# Patient Record
Sex: FEMALE | Race: WHITE | NOT HISPANIC OR LATINO | Employment: FULL TIME | URBAN - METROPOLITAN AREA
[De-identification: names, ages, dates, MRNs, and addresses within clinical notes are randomized per-mention and may not be internally consistent; named-entity substitution may affect disease eponyms.]

---

## 2017-11-09 ENCOUNTER — HISTORICAL (OUTPATIENT)
Dept: RADIOLOGY | Facility: HOSPITAL | Age: 62
End: 2017-11-09

## 2018-10-01 ENCOUNTER — HISTORICAL (OUTPATIENT)
Dept: RADIOLOGY | Facility: HOSPITAL | Age: 63
End: 2018-10-01

## 2018-11-12 ENCOUNTER — HISTORICAL (OUTPATIENT)
Dept: INFUSION THERAPY | Facility: HOSPITAL | Age: 63
End: 2018-11-12

## 2018-11-12 ENCOUNTER — HISTORICAL (OUTPATIENT)
Dept: RADIOLOGY | Facility: HOSPITAL | Age: 63
End: 2018-11-12

## 2019-11-14 ENCOUNTER — HISTORICAL (OUTPATIENT)
Dept: RADIOLOGY | Facility: HOSPITAL | Age: 64
End: 2019-11-14

## 2020-11-16 ENCOUNTER — HISTORICAL (OUTPATIENT)
Dept: RADIOLOGY | Facility: HOSPITAL | Age: 65
End: 2020-11-16

## 2021-12-02 ENCOUNTER — HISTORICAL (OUTPATIENT)
Dept: RADIOLOGY | Facility: HOSPITAL | Age: 66
End: 2021-12-02

## 2021-12-07 ENCOUNTER — HISTORICAL (OUTPATIENT)
Dept: RADIOLOGY | Facility: HOSPITAL | Age: 66
End: 2021-12-07

## 2021-12-09 ENCOUNTER — HISTORICAL (OUTPATIENT)
Dept: RADIOLOGY | Facility: HOSPITAL | Age: 66
End: 2021-12-09

## 2022-01-05 ENCOUNTER — HISTORICAL (OUTPATIENT)
Dept: RADIOLOGY | Facility: HOSPITAL | Age: 67
End: 2022-01-05

## 2022-01-06 ENCOUNTER — HISTORICAL (OUTPATIENT)
Dept: PREADMISSION TESTING | Facility: HOSPITAL | Age: 67
End: 2022-01-06

## 2022-01-06 LAB
ABS NEUT (OLG): 3.85 X10(3)/MCL (ref 2.1–9.2)
BASOPHILS # BLD AUTO: 0 X10(3)/MCL (ref 0–0.2)
BASOPHILS NFR BLD AUTO: 1 %
BUN SERPL-MCNC: 17.3 MG/DL (ref 9.8–20.1)
CALCIUM SERPL-MCNC: 9.6 MG/DL (ref 8.7–10.5)
CHLORIDE SERPL-SCNC: 104 MMOL/L (ref 98–107)
CO2 SERPL-SCNC: 26 MMOL/L (ref 23–31)
CREAT SERPL-MCNC: 0.77 MG/DL (ref 0.55–1.02)
CREAT/UREA NIT SERPL: 22
EOSINOPHIL # BLD AUTO: 0.2 X10(3)/MCL (ref 0–0.9)
EOSINOPHIL NFR BLD AUTO: 3 %
ERYTHROCYTE [DISTWIDTH] IN BLOOD BY AUTOMATED COUNT: 14.3 % (ref 11.5–17)
GLUCOSE SERPL-MCNC: 127 MG/DL (ref 82–115)
HCT VFR BLD AUTO: 46.3 % (ref 37–47)
HGB BLD-MCNC: 14.4 GM/DL (ref 12–16)
LYMPHOCYTES # BLD AUTO: 1.6 X10(3)/MCL (ref 0.6–4.6)
LYMPHOCYTES NFR BLD AUTO: 25 %
MCH RBC QN AUTO: 27.3 PG (ref 27–31)
MCHC RBC AUTO-ENTMCNC: 31.1 GM/DL (ref 33–36)
MCV RBC AUTO: 87.9 FL (ref 80–94)
MONOCYTES # BLD AUTO: 0.5 X10(3)/MCL (ref 0.1–1.3)
MONOCYTES NFR BLD AUTO: 8 %
NEUTROPHILS # BLD AUTO: 3.85 X10(3)/MCL (ref 2.1–9.2)
NEUTROPHILS NFR BLD AUTO: 62 %
PLATELET # BLD AUTO: 227 X10(3)/MCL (ref 130–400)
PMV BLD AUTO: 10.1 FL (ref 9.4–12.4)
POTASSIUM SERPL-SCNC: 4.8 MMOL/L (ref 3.5–5.1)
RBC # BLD AUTO: 5.27 X10(6)/MCL (ref 4.2–5.4)
SARS-COV-2 AG RESP QL IA.RAPID: NEGATIVE
SODIUM SERPL-SCNC: 140 MMOL/L (ref 136–145)
WBC # SPEC AUTO: 6.2 X10(3)/MCL (ref 4.5–11.5)

## 2022-01-07 ENCOUNTER — HISTORICAL (OUTPATIENT)
Dept: SURGERY | Facility: HOSPITAL | Age: 67
End: 2022-01-07

## 2022-01-24 ENCOUNTER — HISTORICAL (OUTPATIENT)
Dept: RADIATION THERAPY | Facility: HOSPITAL | Age: 67
End: 2022-01-24

## 2022-03-07 ENCOUNTER — HISTORICAL (OUTPATIENT)
Dept: RADIATION THERAPY | Facility: HOSPITAL | Age: 67
End: 2022-03-07

## 2022-03-10 ENCOUNTER — HISTORICAL (OUTPATIENT)
Dept: RADIATION THERAPY | Facility: HOSPITAL | Age: 67
End: 2022-03-10

## 2022-03-14 ENCOUNTER — HISTORICAL (OUTPATIENT)
Dept: RADIATION THERAPY | Facility: HOSPITAL | Age: 67
End: 2022-03-14

## 2022-03-15 ENCOUNTER — HISTORICAL (OUTPATIENT)
Dept: RADIATION THERAPY | Facility: HOSPITAL | Age: 67
End: 2022-03-15

## 2022-03-16 ENCOUNTER — HISTORICAL (OUTPATIENT)
Dept: RADIATION THERAPY | Facility: HOSPITAL | Age: 67
End: 2022-03-16

## 2022-03-17 ENCOUNTER — HISTORICAL (OUTPATIENT)
Dept: RADIATION THERAPY | Facility: HOSPITAL | Age: 67
End: 2022-03-17

## 2022-03-18 ENCOUNTER — HISTORICAL (OUTPATIENT)
Dept: RADIATION THERAPY | Facility: HOSPITAL | Age: 67
End: 2022-03-18

## 2022-03-21 ENCOUNTER — HISTORICAL (OUTPATIENT)
Dept: RADIATION THERAPY | Facility: HOSPITAL | Age: 67
End: 2022-03-21

## 2022-03-22 ENCOUNTER — HISTORICAL (OUTPATIENT)
Dept: RADIATION THERAPY | Facility: HOSPITAL | Age: 67
End: 2022-03-22

## 2022-03-23 ENCOUNTER — HISTORICAL (OUTPATIENT)
Dept: RADIATION THERAPY | Facility: HOSPITAL | Age: 67
End: 2022-03-23

## 2022-03-24 ENCOUNTER — HISTORICAL (OUTPATIENT)
Dept: RADIATION THERAPY | Facility: HOSPITAL | Age: 67
End: 2022-03-24

## 2022-03-25 ENCOUNTER — HISTORICAL (OUTPATIENT)
Dept: RADIATION THERAPY | Facility: HOSPITAL | Age: 67
End: 2022-03-25

## 2022-03-28 ENCOUNTER — HISTORICAL (OUTPATIENT)
Dept: RADIATION THERAPY | Facility: HOSPITAL | Age: 67
End: 2022-03-28

## 2022-03-29 ENCOUNTER — HISTORICAL (OUTPATIENT)
Dept: RADIATION THERAPY | Facility: HOSPITAL | Age: 67
End: 2022-03-29

## 2022-03-30 ENCOUNTER — HISTORICAL (OUTPATIENT)
Dept: RADIATION THERAPY | Facility: HOSPITAL | Age: 67
End: 2022-03-30

## 2022-03-31 ENCOUNTER — HISTORICAL (OUTPATIENT)
Dept: RADIATION THERAPY | Facility: HOSPITAL | Age: 67
End: 2022-03-31

## 2022-04-01 ENCOUNTER — HISTORICAL (OUTPATIENT)
Dept: RADIATION THERAPY | Facility: HOSPITAL | Age: 67
End: 2022-04-01

## 2022-04-04 ENCOUNTER — HISTORICAL (OUTPATIENT)
Dept: RADIATION THERAPY | Facility: HOSPITAL | Age: 67
End: 2022-04-04

## 2022-04-05 ENCOUNTER — HISTORICAL (OUTPATIENT)
Dept: RADIATION THERAPY | Facility: HOSPITAL | Age: 67
End: 2022-04-05

## 2022-04-06 ENCOUNTER — HISTORICAL (OUTPATIENT)
Dept: RADIATION THERAPY | Facility: HOSPITAL | Age: 67
End: 2022-04-06

## 2022-04-07 ENCOUNTER — HISTORICAL (OUTPATIENT)
Dept: RADIATION THERAPY | Facility: HOSPITAL | Age: 67
End: 2022-04-07

## 2022-04-08 ENCOUNTER — HISTORICAL (OUTPATIENT)
Dept: RADIATION THERAPY | Facility: HOSPITAL | Age: 67
End: 2022-04-08

## 2022-04-11 ENCOUNTER — HISTORICAL (OUTPATIENT)
Dept: RADIATION THERAPY | Facility: HOSPITAL | Age: 67
End: 2022-04-11

## 2022-04-11 ENCOUNTER — HISTORICAL (OUTPATIENT)
Dept: ADMINISTRATIVE | Facility: HOSPITAL | Age: 67
End: 2022-04-11
Payer: COMMERCIAL

## 2022-04-25 VITALS
DIASTOLIC BLOOD PRESSURE: 92 MMHG | HEIGHT: 67 IN | SYSTOLIC BLOOD PRESSURE: 130 MMHG | WEIGHT: 260.56 LBS | BODY MASS INDEX: 40.9 KG/M2

## 2022-05-19 ENCOUNTER — OFFICE VISIT (OUTPATIENT)
Dept: URGENT CARE | Facility: CLINIC | Age: 67
End: 2022-05-19
Payer: COMMERCIAL

## 2022-05-19 VITALS
BODY MASS INDEX: 38.04 KG/M2 | HEIGHT: 68 IN | OXYGEN SATURATION: 98 % | DIASTOLIC BLOOD PRESSURE: 93 MMHG | SYSTOLIC BLOOD PRESSURE: 155 MMHG | TEMPERATURE: 98 F | RESPIRATION RATE: 17 BRPM | HEART RATE: 75 BPM | WEIGHT: 251 LBS

## 2022-05-19 DIAGNOSIS — J06.9 ACUTE URI: Primary | ICD-10-CM

## 2022-05-19 PROCEDURE — 3008F BODY MASS INDEX DOCD: CPT | Mod: CPTII,,, | Performed by: FAMILY MEDICINE

## 2022-05-19 PROCEDURE — 1160F RVW MEDS BY RX/DR IN RCRD: CPT | Mod: CPTII,,, | Performed by: FAMILY MEDICINE

## 2022-05-19 PROCEDURE — 3077F SYST BP >= 140 MM HG: CPT | Mod: CPTII,,, | Performed by: FAMILY MEDICINE

## 2022-05-19 PROCEDURE — 3080F DIAST BP >= 90 MM HG: CPT | Mod: CPTII,,, | Performed by: FAMILY MEDICINE

## 2022-05-19 PROCEDURE — 99203 OFFICE O/P NEW LOW 30 MIN: CPT | Mod: S$GLB,,, | Performed by: FAMILY MEDICINE

## 2022-05-19 PROCEDURE — 99203 PR OFFICE/OUTPT VISIT, NEW, LEVL III, 30-44 MIN: ICD-10-PCS | Mod: S$GLB,,, | Performed by: FAMILY MEDICINE

## 2022-05-19 PROCEDURE — 4010F ACE/ARB THERAPY RXD/TAKEN: CPT | Mod: CPTII,,, | Performed by: FAMILY MEDICINE

## 2022-05-19 PROCEDURE — 3080F PR MOST RECENT DIASTOLIC BLOOD PRESSURE >= 90 MM HG: ICD-10-PCS | Mod: CPTII,,, | Performed by: FAMILY MEDICINE

## 2022-05-19 PROCEDURE — 1159F PR MEDICATION LIST DOCUMENTED IN MEDICAL RECORD: ICD-10-PCS | Mod: CPTII,,, | Performed by: FAMILY MEDICINE

## 2022-05-19 PROCEDURE — 1160F PR REVIEW ALL MEDS BY PRESCRIBER/CLIN PHARMACIST DOCUMENTED: ICD-10-PCS | Mod: CPTII,,, | Performed by: FAMILY MEDICINE

## 2022-05-19 PROCEDURE — 1159F MED LIST DOCD IN RCRD: CPT | Mod: CPTII,,, | Performed by: FAMILY MEDICINE

## 2022-05-19 PROCEDURE — 3008F PR BODY MASS INDEX (BMI) DOCUMENTED: ICD-10-PCS | Mod: CPTII,,, | Performed by: FAMILY MEDICINE

## 2022-05-19 PROCEDURE — 4010F PR ACE/ARB THEARPY RXD/TAKEN: ICD-10-PCS | Mod: CPTII,,, | Performed by: FAMILY MEDICINE

## 2022-05-19 PROCEDURE — 3077F PR MOST RECENT SYSTOLIC BLOOD PRESSURE >= 140 MM HG: ICD-10-PCS | Mod: CPTII,,, | Performed by: FAMILY MEDICINE

## 2022-05-19 RX ORDER — LACOSAMIDE 100 MG/1
TABLET ORAL EVERY 12 HOURS
COMMUNITY
End: 2022-06-02 | Stop reason: SDUPTHER

## 2022-05-19 RX ORDER — ALBUTEROL SULFATE 90 UG/1
2 AEROSOL, METERED RESPIRATORY (INHALATION) EVERY 6 HOURS PRN
Qty: 18 G | Refills: 0 | Status: SHIPPED | OUTPATIENT
Start: 2022-05-19 | End: 2022-08-29

## 2022-05-19 RX ORDER — ATORVASTATIN CALCIUM 10 MG/1
10 TABLET, FILM COATED ORAL DAILY
COMMUNITY
End: 2023-07-05

## 2022-05-19 RX ORDER — LOSARTAN POTASSIUM 100 MG/1
100 TABLET ORAL DAILY
COMMUNITY
End: 2023-01-05 | Stop reason: DRUGHIGH

## 2022-05-19 RX ORDER — LETROZOLE 2.5 MG/1
2.5 TABLET, FILM COATED ORAL DAILY
COMMUNITY
Start: 2022-04-18 | End: 2022-07-05

## 2022-05-19 RX ORDER — PREDNISONE 20 MG/1
40 TABLET ORAL DAILY
Qty: 6 TABLET | Refills: 0 | Status: SHIPPED | OUTPATIENT
Start: 2022-05-19 | End: 2022-05-22

## 2022-05-19 NOTE — PROGRESS NOTES
"Subjective:       Patient ID: Yoli Burnett is a 66 y.o. female.    Vitals:  height is 5' 8" (1.727 m) and weight is 113.9 kg (251 lb). Her temperature is 98.3 °F (36.8 °C). Her blood pressure is 155/93 (abnormal) and her pulse is 75. Her respiration is 17 and oxygen saturation is 98%.     Chief Complaint: Cough    Cough  This is a new problem. The current episode started in the past 7 days. The problem has been unchanged. The cough is non-productive. Associated symptoms include nasal congestion and shortness of breath. Nothing aggravates the symptoms. She has tried OTC cough suppressant for the symptoms. The treatment provided no relief.     HPI :   65-year-old present to clinic with concerns of nasal congestion, sinus congestion, postnasal drip and coughing since 5 days.  Clear mucus.  Appears concerned with coughing.  No history of asthma.  Does not smoke tobacco.  With possible exposure to COVID-19, 2 COVID-19 test in the office 5 days have been negative.    ROS  Constitutional : _No fatigue, No Fever  HEENT : _No sore throat, no ear pain, no sinus congestion  Neck : No pain, range of motion present  Respiratory : _Coughing, mucus production clear  Cardiovascular : _No chest pain, no palpitations  Gastrointestinal : _No vomiting or diarrhea. No abdominal pain  Integumentary : _No skin rash or abnormal lesion  Neurologic_No headache, No dizziness  Objective:      Physical Exam    General : Alert and Oriented, No apparent distress, afebrile  Neck - supple  HENT : Oropharynx no redness or swelling.  TMs intact mild fluid no redness.   Respiratory : Bilateral equal breath sounds, nonlabored respirations  Cardiovascular : Rate, rhythm regular, normal volume pulse, no murmur  Integumentary : Warm, Dry and no rash  Assessment:       1. Acute URI          Plan:       Discussed the physical findings, condition and course.  Adequate hydration.  Antihistamine of choice over-the-counter for condition.  Coricidin HBP " for .  Prednisone for symptom relief.  Call or return to clinic for any questions.  Acute URI  -     predniSONE (DELTASONE) 20 MG tablet; Take 2 tablets (40 mg total) by mouth once daily. for 3 days  Dispense: 6 tablet; Refill: 0  -     albuterol (PROAIR HFA) 90 mcg/actuation inhaler; Inhale 2 puffs into the lungs every 6 (six) hours as needed for Wheezing or Shortness of Breath. Rescue  Dispense: 18 g; Refill: 0

## 2022-05-20 DIAGNOSIS — M85.80 OSTEOPENIA, UNSPECIFIED LOCATION: ICD-10-CM

## 2022-05-20 DIAGNOSIS — C50.112 MALIGNANT NEOPLASM OF CENTRAL PORTION OF LEFT FEMALE BREAST, UNSPECIFIED ESTROGEN RECEPTOR STATUS: Primary | ICD-10-CM

## 2022-05-23 PROBLEM — L90.0 LICHEN SCLEROSUS ET ATROPHICUS: Status: ACTIVE | Noted: 2022-05-23

## 2022-05-23 PROBLEM — C50.119 MALIGNANT NEOPLASM OF CENTRAL PORTION OF FEMALE BREAST: Status: ACTIVE | Noted: 2022-05-23

## 2022-05-23 PROBLEM — E66.9 OBESITY: Status: ACTIVE | Noted: 2022-05-23

## 2022-05-23 PROBLEM — H02.409 PTOSIS OF EYELID: Status: ACTIVE | Noted: 2022-05-23

## 2022-05-23 PROBLEM — M85.80 OSTEOPENIA: Status: ACTIVE | Noted: 2022-05-23

## 2022-05-23 PROBLEM — I10 HYPERTENSION: Status: ACTIVE | Noted: 2022-05-23

## 2022-05-23 PROBLEM — Z86.011 HISTORY OF CEREBRAL MENINGIOMA: Status: ACTIVE | Noted: 2022-05-23

## 2022-05-23 NOTE — PROGRESS NOTES
Subjective:       Patient ID: Yoli Burnett is a 66 y.o. female.    Referring physician: Dr. Mary Painter  Reason for Referral: Breast Cancer    PCP: Dr. Valerie Quispe    Left Breast Cancer Stage IA ([id number])--Diagnosed 21  Biopsy/pathology: Left breast 11:00 mass biopsy done 21--invasive carcinoma with mixed ductal and lobular features, Grade 1, carcinoma present on 5 cores and measures at least 7mm, ER 95%, CO 96%, Her 2 0 by IHC, negative, Ki67 low 5%.   Surgery/pathology: Left breast lumpectomy with SLN biopsy done 22--invasive ductal carcinoma 8mm, Grade 1 with organizing biopsy site changes, no LVI, no DCIS, resection margin clear, proliferative fibrocystic changes with focal atypical lobular hyperplasia and LCIS, 2 SLNs negative.   Imagin. Screening MMG bilateral 21--focal asymmetry with possible architectural distortion in the left beast UIQ middle depth needs further evaluation, BIRADS 0.   2. Left Diagnostic MMG/US done 21--irregular, spiculated mass in 11:00 left breast, posterior depth, highly suggestive of malignancy, measures 11mm, BIRADS 5.     DEXA:  2020--AP spine T= -0.3, Femur neck left -2.1, right -1.8, total femur left -2.1, right -2.2 c/w osteopenia.     Treatment History:  Adjuvant XRT x 4 weeks, completed on 22.     Current treatment plan:   1. AI X 5-10 years. Started on 22.     Chief Complaint: Other Misc (Pt reports no new concerns today.)    HPI   The patient presents today for scheduled follow-up for her breast cancer. Started Femara after her last appointment and is tolerating okay at this time. She does mention headaches about 30 min after she takes but they do last long. Also complains of worsening joint pains to bilateral knees and mentions she is in need of bilateral knee replacement. Scheduled for post-lumpectomy MMG in July. Otherwise she is doing well without any problems reported today. She is glad to have summer break.         Past Medical History:   Diagnosis Date    Breast cancer     Hyperlipidemia     Hypertension     Seizures       Review of patient's allergies indicates:  No Known Allergies     Current Outpatient Medications:     atorvastatin (LIPITOR) 10 MG tablet, Take 10 mg by mouth once daily., Disp: , Rfl:     lacosamide (VIMPAT) 100 mg Tab, Take by mouth every 12 (twelve) hours., Disp: , Rfl:     letrozole (FEMARA) 2.5 mg Tab, Take 2.5 mg by mouth once daily., Disp: , Rfl:     losartan (COZAAR) 100 MG tablet, Take 100 mg by mouth once daily., Disp: , Rfl:     albuterol (PROAIR HFA) 90 mcg/actuation inhaler, Inhale 2 puffs into the lungs every 6 (six) hours as needed for Wheezing or Shortness of Breath. Rescue (Patient not taking: Reported on 5/30/2022), Disp: 18 g, Rfl: 0  Review of Systems   Constitutional: Negative for appetite change and unexpected weight change.   HENT: Negative for mouth sores.    Eyes: Negative for visual disturbance.   Respiratory: Positive for cough. Negative for shortness of breath.    Cardiovascular: Negative for chest pain.   Gastrointestinal: Negative for abdominal pain and diarrhea.   Genitourinary: Negative for frequency.   Musculoskeletal: Negative for back pain.   Integumentary:  Negative for rash.   Neurological: Positive for headaches.   Hematological: Negative for adenopathy.   Psychiatric/Behavioral: The patient is not nervous/anxious.             Vitals:    05/30/22 1412   BP: 131/78   Pulse: 71   Resp: 14   Temp: 98.1 °F (36.7 °C)      Physical Exam  Vitals reviewed.   Constitutional:       Appearance: Normal appearance. She is normal weight.   HENT:      Head: Normocephalic.   Eyes:      General: Lids are normal. Vision grossly intact.      Extraocular Movements: Extraocular movements intact.      Conjunctiva/sclera: Conjunctivae normal.   Cardiovascular:      Rate and Rhythm: Normal rate and regular rhythm.      Pulses: Normal pulses.      Heart sounds: Normal heart sounds,  S1 normal and S2 normal.   Pulmonary:      Effort: Pulmonary effort is normal.      Breath sounds: Normal breath sounds.   Chest:      Comments: Left breast lumpectomy incision and axillary incision intact  Abdominal:      General: Abdomen is flat. Bowel sounds are normal.      Palpations: Abdomen is soft.   Musculoskeletal:      Cervical back: Normal, normal range of motion and neck supple.      Thoracic back: Normal.      Lumbar back: Normal.   Feet:      Right foot:      Skin integrity: Skin integrity normal.   Skin:     General: Skin is warm.      Capillary Refill: Capillary refill takes less than 2 seconds.   Neurological:      Mental Status: She is alert and oriented to person, place, and time.   Psychiatric:         Attention and Perception: Attention normal.         Mood and Affect: Mood normal.         Speech: Speech normal.         Behavior: Behavior normal. Behavior is cooperative.         Cognition and Memory: Cognition normal.         Judgment: Judgment normal.       ECOG SCORE    0 - Fully active-able to carry on all pre-disease performance without restriction       Lab Visit on 05/26/2022   Component Date Value    Albumin Level 05/26/2022 4.4     Alkaline Phosphatase 05/26/2022 75     Alanine Aminotransferase 05/26/2022 23     Aspartate Aminotransfera* 05/26/2022 23     Bilirubin Direct 05/26/2022 0.3     Bilirubin Indirect 05/26/2022 0.60     Bilirubin Total 05/26/2022 0.9     Protein Total 05/26/2022 6.9     WBC 05/26/2022 7.9     RBC 05/26/2022 5.07     Hgb 05/26/2022 13.9     Hct 05/26/2022 43.8     MCV 05/26/2022 86.4     MCH 05/26/2022 27.4     MCHC 05/26/2022 31.7 (A)    RDW 05/26/2022 13.7     Platelet 05/26/2022 241     MPV 05/26/2022 9.0 (A)    Neut % 05/26/2022 65.5     Lymph % 05/26/2022 22.8     Mono % 05/26/2022 9.0     Eos % 05/26/2022 1.3     Basophil % 05/26/2022 0.4     Lymph # 05/26/2022 1.80     Neut # 05/26/2022 5.2     Mono # 05/26/2022 0.71     Eos #  05/26/2022 0.10     Baso # 05/26/2022 0.03     IG# 05/26/2022 0.08 (A)    IG% 05/26/2022 1.0 (A)    Pathology Review 05/26/2022 No demonstrated chemical evidence of hepatic or biliary injury or insufficiency.     Otto Mittal MD             Assessment:       Problem List Items Addressed This Visit        Oncology    Malignant neoplasm of central portion of female breast - Primary    Overview     left           Relevant Orders    Comprehensive Metabolic Panel    CBC Auto Differential             Plan:     Patient with stage IA left breast cancer s/p lumpectomy done 1/7/22. Pathology showed 8mm IDCA, Grade 1 with clear margins, 2 negative SLNs. Tumor strongly ER and FL positive and Her2 negative.  Per NCCN guidelines, T1bN0 low grade tumors without LVI were not included in [employer] and are treated with endocrine therapy alone due to low risk.  Patient is meeting with radiation oncology next week and will likely be receiving post-operative radiation.  Dr. Woodson recommended treatment with AI x 5-10 years.    Completed adjuvant XRT x 4 weeks on 4/8/22.   Started Femara on 4/22/22. Tolerating with exception of headaches and worsening joint pains. She is willing to continue on the medication for now.   Patient did have DEXA in 11/2020 that showed osteopenia. Would need to start Fosamax vs. Prolia and plan to repeat DEXA in 11/2022. She would like to wait until after her DEXA scan before making a decision.   Continue Calcium + Vitamin D3 twice daily.   Will begin routine surveillance visits every 3 months.   Left breast MMG scheduled for July 2022.   All questions answered at this time.      WHITNEY Cardenas

## 2022-05-26 ENCOUNTER — LAB VISIT (OUTPATIENT)
Dept: LAB | Facility: HOSPITAL | Age: 67
End: 2022-05-26
Attending: INTERNAL MEDICINE
Payer: COMMERCIAL

## 2022-05-26 DIAGNOSIS — C50.112 MALIGNANT NEOPLASM OF CENTRAL PORTION OF LEFT FEMALE BREAST, UNSPECIFIED ESTROGEN RECEPTOR STATUS: ICD-10-CM

## 2022-05-26 DIAGNOSIS — M85.80 OSTEOPENIA, UNSPECIFIED LOCATION: ICD-10-CM

## 2022-05-26 LAB
ALBUMIN SERPL-MCNC: 4.4 GM/DL (ref 3.4–4.8)
ALP SERPL-CCNC: 75 UNIT/L (ref 40–150)
ALT SERPL-CCNC: 23 UNIT/L (ref 0–55)
AST SERPL-CCNC: 23 UNIT/L (ref 5–34)
BASOPHILS # BLD AUTO: 0.03 X10(3)/MCL (ref 0–0.2)
BASOPHILS NFR BLD AUTO: 0.4 %
BILIRUBIN DIRECT+TOT PNL SERPL-MCNC: 0.3 MG/DL (ref 0–0.5)
BILIRUBIN DIRECT+TOT PNL SERPL-MCNC: 0.6 MG/DL (ref 0–0.8)
BILIRUBIN DIRECT+TOT PNL SERPL-MCNC: 0.9 MG/DL
EOSINOPHIL # BLD AUTO: 0.1 X10(3)/MCL (ref 0–0.9)
EOSINOPHIL NFR BLD AUTO: 1.3 %
ERYTHROCYTE [DISTWIDTH] IN BLOOD BY AUTOMATED COUNT: 13.7 % (ref 11.5–17)
HCT VFR BLD AUTO: 43.8 % (ref 37–47)
HGB BLD-MCNC: 13.9 GM/DL (ref 12–16)
IMM GRANULOCYTES # BLD AUTO: 0.08 X10(3)/MCL (ref 0–0.02)
IMM GRANULOCYTES NFR BLD AUTO: 1 % (ref 0–0.43)
LYMPHOCYTES # BLD AUTO: 1.8 X10(3)/MCL (ref 0.6–4.6)
LYMPHOCYTES NFR BLD AUTO: 22.8 %
MCH RBC QN AUTO: 27.4 PG (ref 27–31)
MCHC RBC AUTO-ENTMCNC: 31.7 MG/DL (ref 33–36)
MCV RBC AUTO: 86.4 FL (ref 80–94)
MONOCYTES # BLD AUTO: 0.71 X10(3)/MCL (ref 0.1–1.3)
MONOCYTES NFR BLD AUTO: 9 %
NEUTROPHILS # BLD AUTO: 5.2 X10(3)/MCL (ref 2.1–9.2)
NEUTROPHILS NFR BLD AUTO: 65.5 %
PATH REV: NORMAL
PLATELET # BLD AUTO: 241 X10(3)/MCL (ref 130–400)
PMV BLD AUTO: 9 FL (ref 9.4–12.4)
PROT SERPL-MCNC: 6.9 GM/DL (ref 5.8–7.6)
RBC # BLD AUTO: 5.07 X10(6)/MCL (ref 4.2–5.4)
WBC # SPEC AUTO: 7.9 X10(3)/MCL (ref 4.5–11.5)

## 2022-05-26 PROCEDURE — 85025 COMPLETE CBC W/AUTO DIFF WBC: CPT

## 2022-05-26 PROCEDURE — 80076 HEPATIC FUNCTION PANEL: CPT

## 2022-05-26 PROCEDURE — 36415 COLL VENOUS BLD VENIPUNCTURE: CPT

## 2022-05-30 ENCOUNTER — OFFICE VISIT (OUTPATIENT)
Dept: HEMATOLOGY/ONCOLOGY | Facility: CLINIC | Age: 67
End: 2022-05-30
Payer: COMMERCIAL

## 2022-05-30 VITALS
HEART RATE: 71 BPM | TEMPERATURE: 98 F | HEIGHT: 68 IN | BODY MASS INDEX: 37.89 KG/M2 | DIASTOLIC BLOOD PRESSURE: 78 MMHG | WEIGHT: 250 LBS | RESPIRATION RATE: 14 BRPM | SYSTOLIC BLOOD PRESSURE: 131 MMHG | OXYGEN SATURATION: 98 %

## 2022-05-30 DIAGNOSIS — Z17.0 MALIGNANT NEOPLASM OF CENTRAL PORTION OF LEFT BREAST IN FEMALE, ESTROGEN RECEPTOR POSITIVE: Primary | ICD-10-CM

## 2022-05-30 DIAGNOSIS — C50.112 MALIGNANT NEOPLASM OF CENTRAL PORTION OF LEFT BREAST IN FEMALE, ESTROGEN RECEPTOR POSITIVE: Primary | ICD-10-CM

## 2022-05-30 PROCEDURE — 3078F PR MOST RECENT DIASTOLIC BLOOD PRESSURE < 80 MM HG: ICD-10-PCS | Mod: CPTII,S$GLB,, | Performed by: NURSE PRACTITIONER

## 2022-05-30 PROCEDURE — 1160F PR REVIEW ALL MEDS BY PRESCRIBER/CLIN PHARMACIST DOCUMENTED: ICD-10-PCS | Mod: CPTII,S$GLB,, | Performed by: NURSE PRACTITIONER

## 2022-05-30 PROCEDURE — 1101F PT FALLS ASSESS-DOCD LE1/YR: CPT | Mod: CPTII,S$GLB,, | Performed by: NURSE PRACTITIONER

## 2022-05-30 PROCEDURE — 99214 PR OFFICE/OUTPT VISIT, EST, LEVL IV, 30-39 MIN: ICD-10-PCS | Mod: S$GLB,,, | Performed by: NURSE PRACTITIONER

## 2022-05-30 PROCEDURE — 1126F AMNT PAIN NOTED NONE PRSNT: CPT | Mod: CPTII,S$GLB,, | Performed by: NURSE PRACTITIONER

## 2022-05-30 PROCEDURE — 3288F PR FALLS RISK ASSESSMENT DOCUMENTED: ICD-10-PCS | Mod: CPTII,S$GLB,, | Performed by: NURSE PRACTITIONER

## 2022-05-30 PROCEDURE — 1159F PR MEDICATION LIST DOCUMENTED IN MEDICAL RECORD: ICD-10-PCS | Mod: CPTII,S$GLB,, | Performed by: NURSE PRACTITIONER

## 2022-05-30 PROCEDURE — 3288F FALL RISK ASSESSMENT DOCD: CPT | Mod: CPTII,S$GLB,, | Performed by: NURSE PRACTITIONER

## 2022-05-30 PROCEDURE — 1160F RVW MEDS BY RX/DR IN RCRD: CPT | Mod: CPTII,S$GLB,, | Performed by: NURSE PRACTITIONER

## 2022-05-30 PROCEDURE — 99999 PR PBB SHADOW E&M-EST. PATIENT-LVL IV: CPT | Mod: PBBFAC,,, | Performed by: NURSE PRACTITIONER

## 2022-05-30 PROCEDURE — 3008F BODY MASS INDEX DOCD: CPT | Mod: CPTII,S$GLB,, | Performed by: NURSE PRACTITIONER

## 2022-05-30 PROCEDURE — 3075F SYST BP GE 130 - 139MM HG: CPT | Mod: CPTII,S$GLB,, | Performed by: NURSE PRACTITIONER

## 2022-05-30 PROCEDURE — 1126F PR PAIN SEVERITY QUANTIFIED, NO PAIN PRESENT: ICD-10-PCS | Mod: CPTII,S$GLB,, | Performed by: NURSE PRACTITIONER

## 2022-05-30 PROCEDURE — 1159F MED LIST DOCD IN RCRD: CPT | Mod: CPTII,S$GLB,, | Performed by: NURSE PRACTITIONER

## 2022-05-30 PROCEDURE — 1101F PR PT FALLS ASSESS DOC 0-1 FALLS W/OUT INJ PAST YR: ICD-10-PCS | Mod: CPTII,S$GLB,, | Performed by: NURSE PRACTITIONER

## 2022-05-30 PROCEDURE — 99999 PR PBB SHADOW E&M-EST. PATIENT-LVL IV: ICD-10-PCS | Mod: PBBFAC,,, | Performed by: NURSE PRACTITIONER

## 2022-05-30 PROCEDURE — 3078F DIAST BP <80 MM HG: CPT | Mod: CPTII,S$GLB,, | Performed by: NURSE PRACTITIONER

## 2022-05-30 PROCEDURE — 3008F PR BODY MASS INDEX (BMI) DOCUMENTED: ICD-10-PCS | Mod: CPTII,S$GLB,, | Performed by: NURSE PRACTITIONER

## 2022-05-30 PROCEDURE — 99214 OFFICE O/P EST MOD 30 MIN: CPT | Mod: S$GLB,,, | Performed by: NURSE PRACTITIONER

## 2022-05-30 PROCEDURE — 4010F PR ACE/ARB THEARPY RXD/TAKEN: ICD-10-PCS | Mod: CPTII,S$GLB,, | Performed by: NURSE PRACTITIONER

## 2022-05-30 PROCEDURE — 3075F PR MOST RECENT SYSTOLIC BLOOD PRESS GE 130-139MM HG: ICD-10-PCS | Mod: CPTII,S$GLB,, | Performed by: NURSE PRACTITIONER

## 2022-05-30 PROCEDURE — 4010F ACE/ARB THERAPY RXD/TAKEN: CPT | Mod: CPTII,S$GLB,, | Performed by: NURSE PRACTITIONER

## 2022-06-02 RX ORDER — LACOSAMIDE 100 MG/1
100 TABLET ORAL EVERY 12 HOURS
Qty: 60 TABLET | Refills: 5 | Status: SHIPPED | OUTPATIENT
Start: 2022-06-02 | End: 2022-06-13 | Stop reason: SDUPTHER

## 2022-06-13 NOTE — TELEPHONE ENCOUNTER
Patient called requesting a new prescription can be written for Vimpat 100 mg. States she needs the prescription to have the brand name Vimpat and if the [id number] don't be check off. Reports her insurance company instructed these instructions so the patient can use her Vimpat discount card to help with cost of medication.

## 2022-06-13 NOTE — TELEPHONE ENCOUNTER
Does she want brand name only? If she wants that then you usually DO check off dispense as written.

## 2022-06-16 RX ORDER — LACOSAMIDE 100 MG/1
100 TABLET ORAL EVERY 12 HOURS
Qty: 60 TABLET | Refills: 5 | Status: SHIPPED | OUTPATIENT
Start: 2022-06-16 | End: 2022-07-12

## 2022-07-05 ENCOUNTER — HOSPITAL ENCOUNTER (OUTPATIENT)
Dept: RADIOLOGY | Facility: HOSPITAL | Age: 67
Discharge: HOME | End: 2022-07-05
Attending: PHYSICIAN ASSISTANT
Payer: COMMERCIAL

## 2022-07-05 DIAGNOSIS — C50.312 BREAST CANCER OF LOWER-INNER QUADRANT OF LEFT FEMALE BREAST: ICD-10-CM

## 2022-07-05 PROCEDURE — 77065 DX MAMMO INCL CAD UNI: CPT | Mod: 26,LT,, | Performed by: STUDENT IN AN ORGANIZED HEALTH CARE EDUCATION/TRAINING PROGRAM

## 2022-07-05 PROCEDURE — 77061 BREAST TOMOSYNTHESIS UNI: CPT | Mod: 26,LT,, | Performed by: STUDENT IN AN ORGANIZED HEALTH CARE EDUCATION/TRAINING PROGRAM

## 2022-07-05 PROCEDURE — 77065 MAMMO DIGITAL DIAGNOSTIC LEFT WITH TOMO: ICD-10-PCS | Mod: 26,LT,, | Performed by: STUDENT IN AN ORGANIZED HEALTH CARE EDUCATION/TRAINING PROGRAM

## 2022-07-05 PROCEDURE — 77061 MAMMO DIGITAL DIAGNOSTIC LEFT WITH TOMO: ICD-10-PCS | Mod: 26,LT,, | Performed by: STUDENT IN AN ORGANIZED HEALTH CARE EDUCATION/TRAINING PROGRAM

## 2022-07-05 PROCEDURE — 77065 DX MAMMO INCL CAD UNI: CPT | Mod: TC,LT

## 2022-07-28 ENCOUNTER — OFFICE VISIT (OUTPATIENT)
Dept: SURGERY | Facility: CLINIC | Age: 67
End: 2022-07-28
Payer: COMMERCIAL

## 2022-07-28 VITALS
WEIGHT: 248 LBS | HEART RATE: 73 BPM | HEIGHT: 67 IN | TEMPERATURE: 98 F | SYSTOLIC BLOOD PRESSURE: 141 MMHG | BODY MASS INDEX: 38.92 KG/M2 | RESPIRATION RATE: 18 BRPM | DIASTOLIC BLOOD PRESSURE: 84 MMHG | OXYGEN SATURATION: 96 %

## 2022-07-28 DIAGNOSIS — C50.111 MALIGNANT NEOPLASM OF CENTRAL PORTION OF RIGHT BREAST IN FEMALE, ESTROGEN RECEPTOR POSITIVE: Primary | ICD-10-CM

## 2022-07-28 DIAGNOSIS — Z17.0 MALIGNANT NEOPLASM OF CENTRAL PORTION OF RIGHT BREAST IN FEMALE, ESTROGEN RECEPTOR POSITIVE: Primary | ICD-10-CM

## 2022-07-28 PROCEDURE — 4010F PR ACE/ARB THEARPY RXD/TAKEN: ICD-10-PCS | Mod: CPTII,S$GLB,, | Performed by: PHYSICIAN ASSISTANT

## 2022-07-28 PROCEDURE — 1159F PR MEDICATION LIST DOCUMENTED IN MEDICAL RECORD: ICD-10-PCS | Mod: CPTII,S$GLB,, | Performed by: PHYSICIAN ASSISTANT

## 2022-07-28 PROCEDURE — 99999 PR PBB SHADOW E&M-EST. PATIENT-LVL IV: CPT | Mod: PBBFAC,,, | Performed by: PHYSICIAN ASSISTANT

## 2022-07-28 PROCEDURE — 99999 PR PBB SHADOW E&M-EST. PATIENT-LVL IV: ICD-10-PCS | Mod: PBBFAC,,, | Performed by: PHYSICIAN ASSISTANT

## 2022-07-28 PROCEDURE — 3079F PR MOST RECENT DIASTOLIC BLOOD PRESSURE 80-89 MM HG: ICD-10-PCS | Mod: CPTII,S$GLB,, | Performed by: PHYSICIAN ASSISTANT

## 2022-07-28 PROCEDURE — 1159F MED LIST DOCD IN RCRD: CPT | Mod: CPTII,S$GLB,, | Performed by: PHYSICIAN ASSISTANT

## 2022-07-28 PROCEDURE — 3077F PR MOST RECENT SYSTOLIC BLOOD PRESSURE >= 140 MM HG: ICD-10-PCS | Mod: CPTII,S$GLB,, | Performed by: PHYSICIAN ASSISTANT

## 2022-07-28 PROCEDURE — 3077F SYST BP >= 140 MM HG: CPT | Mod: CPTII,S$GLB,, | Performed by: PHYSICIAN ASSISTANT

## 2022-07-28 PROCEDURE — 3008F BODY MASS INDEX DOCD: CPT | Mod: CPTII,S$GLB,, | Performed by: PHYSICIAN ASSISTANT

## 2022-07-28 PROCEDURE — 1126F PR PAIN SEVERITY QUANTIFIED, NO PAIN PRESENT: ICD-10-PCS | Mod: CPTII,S$GLB,, | Performed by: PHYSICIAN ASSISTANT

## 2022-07-28 PROCEDURE — 3079F DIAST BP 80-89 MM HG: CPT | Mod: CPTII,S$GLB,, | Performed by: PHYSICIAN ASSISTANT

## 2022-07-28 PROCEDURE — 99214 OFFICE O/P EST MOD 30 MIN: CPT | Mod: S$GLB,,, | Performed by: PHYSICIAN ASSISTANT

## 2022-07-28 PROCEDURE — 4010F ACE/ARB THERAPY RXD/TAKEN: CPT | Mod: CPTII,S$GLB,, | Performed by: PHYSICIAN ASSISTANT

## 2022-07-28 PROCEDURE — 3008F PR BODY MASS INDEX (BMI) DOCUMENTED: ICD-10-PCS | Mod: CPTII,S$GLB,, | Performed by: PHYSICIAN ASSISTANT

## 2022-07-28 PROCEDURE — 1126F AMNT PAIN NOTED NONE PRSNT: CPT | Mod: CPTII,S$GLB,, | Performed by: PHYSICIAN ASSISTANT

## 2022-07-28 PROCEDURE — 99214 PR OFFICE/OUTPT VISIT, EST, LEVL IV, 30-39 MIN: ICD-10-PCS | Mod: S$GLB,,, | Performed by: PHYSICIAN ASSISTANT

## 2022-07-28 NOTE — PROGRESS NOTES
[facility] Breast Surg  Breast Surgical Oncology  Follow-Up Patient Office Visit       Referring Provider: No ref. provider found   PCP: Leslie Quispe MD   Care Team:   Medical Oncologist: No care team member to display   Radiation Oncologist: No care team member to display   OBGYN: No data on file.     Chief Complaint:   Chief Complaint   Patient presents with    Follow-up     Follow up visit        Subjective:     Cancer Staging  Malignant neoplasm of central portion of female breast  Staging form: Breast, AJCC 8th Edition  - Pathologic stage from 12/9/2021: Stage IA (pT1b, pN0(sn), cM0, G1, ER+, NC+, HER2-) - Signed by JL Darnell on 5/30/2022      Treatment History:  1. S/P left breast partial mastectomy and SLNB 1/7/2021  2. Adjuvant XRT x 4 weeks, completed on 4/8/22.   3. AI expected X 5-10 years. Femara started on 4/22/22.     Interval History:  7/28/2022 - Yoli Burnett is here for follow up after her L DG MG on 7/5/2022 which was benign. In the interval, she has finished radiation and started endocrine therapy. She was tolerating is okay other than occasional headaches, but within the last few weeks has been having an increase in joint aches which are becoming intolerable for her.    HPI:  Yoli Burnett initially presents on 12/28/21 at age 66 Years with AJCC 8th ed clinical anatomic stage IA / prognostic stage IA (cT1c cN0 M0) Left breast 11 o'clock posterior depth infiltrating mammary carcinoma with mixed ductal and lobular features grade 1 ER 95%, NC 96%, HER2 0 - negative on IHC and Ki 67 - 5%.  She is s/p left breast partial mastectomy and SLNB on 1/7/2021. Surgical pathology revealed grade 1 invasive ductal carcinoma measuring 8 mm. No DCIS identified. Resection margin clear. Other findings include atypical lobular hyperplasia and lobular carcinoma in situ. Two sentinel lymph nodes negative for metastases.    A detailed patient history was  obtained and reviewed.    Imagin. 2021 BL SC MG at [facility] - which revealed in the left breast a focal asymmetry in the left upper inner quadrant middle depth with possible associated architectural distortion and no other significant finding in the right breast. BI-RADS 0: additional imaging needed  2. 2021 Left DG MG / US at [facility] - which revealed on L MG a 1.1 cm irregular mass with spiculated margins in the 11 o'clock posterior depth. On L US at 11 o'clock 5 cm FN a subtle 1.1 cm x 0.8 cm x 0.9 cm irregular, hypoechoic mass with spiculated margins is noted and corresponds to the mammographic finding. BI-RADS 5: highly suspicious and biopsy was recommended.    Pathology:  . 2021 Stereotactic-guided core biopsy Left Breast 11 o'clock posterior depth - Invasive carcinoma with mixed ductal and lobular features, grade 1  ER 95%  IN 96%  HER2 Arvin 0 on IHC  Ki 67 5% (low)    2. 2022 L Partial mastectomy and SLNB - grade 1 invasive ductal carcinoma measuring 8 mm. No DCIS identified. Resection margin clear. Other findings include atypical lobular hyperplasia and lobular carcinoma in situ. Two sentinel lymph nodes negative for metastases.    OB/GYN History:  Menarche Onset: 13  Menopause: Post, at age:  Hormonal birth control (duration): no   Pregnancies: 3  Age at first pregnancy: 27  Child births: 3  Breastfeeding duration: no   Hysterectomy: no  Oophorectomy: no  HRT: no    Other:  # of breast biopsies (when and pathology results): none  MG breast density: Category B (Scattered fibroglandular)  Prior thoracic RT: none  Genetic testing: none  Ashkenazi Hindu descent: No    Family History:  Family History   Problem Relation Age of Onset    No Known Problems Mother     No Known Problems Father    Denies family history of breast cancer.     Patient History:  Past Medical History:   Diagnosis Date    Breast cancer     Hyperlipidemia     Hypertension     Seizures        Past Surgical History:    Procedure Laterality Date    BRAIN SURGERY      BREAST SURGERY       SECTION      KNEE SURGERY      SINUS SURGERY         Social History     Socioeconomic History    Marital status:    Tobacco Use    Smoking status: Never Smoker    Smokeless tobacco: Never Used   Substance and Sexual Activity    Alcohol use: Not Currently    Drug use: Never    Sexual activity: Yes       Immunization History   Administered Date(s) Administered    COVID-19, MRNA, LN-S, PF (Pfizer) (Purple Cap) 2021, 2021       Medications/Allergies:  Current Outpatient Medications on File Prior to Visit   Medication Sig Dispense Refill    atorvastatin (LIPITOR) 10 MG tablet Take 10 mg by mouth once daily.      lacosamide (VIMPAT) 100 mg Tab TAKE 1 TABLET BY MOUTH EVERY 12 HOURS 60 tablet 5    letrozole (FEMARA) 2.5 mg Tab TAKE 1 TABLET BY MOUTH EVERY DAY 90 tablet 1    losartan (COZAAR) 100 MG tablet Take 100 mg by mouth once daily.      albuterol (PROAIR HFA) 90 mcg/actuation inhaler Inhale 2 puffs into the lungs every 6 (six) hours as needed for Wheezing or Shortness of Breath. Rescue (Patient not taking: No sig reported) 18 g 0     No current facility-administered medications on file prior to visit.       Review of patient's allergies indicates:  No Known Allergies    Review of Systems:  Pertinent items are noted in HPI.     Objective:     Vitals:  Vitals:    22 0918   BP: (!) 141/84   Pulse: 73   Resp: 18   Temp: 98.4 °F (36.9 °C)       Body mass index is 38.84 kg/m².     Physical Exam:  General: The patient is awake, alert and oriented times three. The patient is well nourished and in no acute distress.  Neck: There is no evidence of palpable cervical, supraclavicular or axillary adenopathy. The neck is supple. The thyroid is not enlarged.  Musculoskeletal: The patient has a normal range of motion of her bilateral upper extremities.  Chest: Examination of the chest wall fails to reveal any  obvious abnormalities. Nonlabored breathing, symmetric expansion.  Breast:  Right: Examination of right breast fails to reveal any dominant masses or areas of significant focal nodularity. The nipple is everted without evidence of discharge. There is no skin dimpling with movement of the pectoralis. There are no significant skin changes overlying the breast.   Left: Examination of the left breast fails to reveal any dominant masses or areas of significant focal nodularity. The nipple is everted without evidence of discharge. There is no skin dimpling with movement of the pectoralis. There are no significant skin changes overlying the breast.  Abdomen: The abdomen is soft, flat, nontender and nondistended.  Integumentary: no rashes or skin lesions present  Neurologic: cranial nerves intact, no signs of peripheral neurological deficit, motor/sensory function intact      Assessment and Plan:       There are no diagnoses linked to this encounter.      Plan:       She is experiencing increased joint aches lately. Plans to talk with medical oncology regarding this.    BL DG MG due Dec 2022.    RTC in 6 months for CBE.    Healthy lifestyle guidelines were reviewed. She was encouraged to engage in regular exercise, maintain a healthy body weight, and avoid excessive alcohol consumption. Healthy nutritional guidelines were also discussed. Self-breast examination was reviewed with the patient in detail and she was encouraged to perform this on a monthly basis.          All of her questions were answered. She was advised to call if she develops any questions or concerns.    Jelly Richardson PA-C                Total time on the date of the visit ranged from 30-39 mins (02671). Total time includes both face-to-face and non-face-to-face time personally spent by myself on the day of the visit.    Non-face-to-face time included:  _X_ preparing to see the patient such as reviewing the patient record  __ obtaining and reviewing  separately obtained history  _X_ independently interpreting results  _X_ documenting clinical information in electronic health record.    Face-to-face time included:  _X_ performing an appropriate history and examination  _X_ communicating results to the patient  _X_ counseling and educating the patient  __ ordering appropriate medications  _x_ ordering appropriate tests  _X_ ordering appropriate procedures (including follow-up)  _X_ answering any questions the patient had    Total Time spent on date of visit: 35 minutes

## 2022-08-22 NOTE — PROGRESS NOTES
Subjective:       Patient ID: Yoli Burnett is a 67 y.o. female.    Referring physician: Dr. Mary Painter  Reason for Referral: Breast Cancer    PCP: Dr. Valerie Quispe    Left Breast Cancer Stage IA ([id number])--Diagnosed 21  Biopsy/pathology: Left breast 11:00 mass biopsy done 21--invasive carcinoma with mixed ductal and lobular features, Grade 1, carcinoma present on 5 cores and measures at least 7mm, ER 95%, AK 96%, Her 2 0 by IHC, negative, Ki67 low 5%.   Surgery/pathology: Left breast lumpectomy with SLN biopsy done 22--invasive ductal carcinoma 8mm, Grade 1 with organizing biopsy site changes, no LVI, no DCIS, resection margin clear, proliferative fibrocystic changes with focal atypical lobular hyperplasia and LCIS, 2 SLNs negative.   Imagin. Screening MMG bilateral 21--focal asymmetry with possible architectural distortion in the left beast UIQ middle depth needs further evaluation, BIRADS 0.   2. Left Diagnostic MMG/US done 21--irregular, spiculated mass in 11:00 left breast, posterior depth, highly suggestive of malignancy, measures 11mm, BIRADS 5.     DEXA:  2020--AP spine T= -0.3, Femur neck left -2.1, right -1.8, total femur left -2.1, right -2.2 c/w osteopenia.     Treatment History:  Adjuvant XRT x 4 weeks, completed on 22.     Current treatment plan:   1. AI X 5-10 years. Started on 22.     Chief Complaint: Other Misc (Pt reports no new concerns today.)    HPI   The patient presents today for scheduled follow-up for her breast cancer. She is doing well. Started Femara in April and is tolerating okay at this time. She does mention her headaches have improved. Continues with worsening joint pains to bilateral knees and mentions she is in need of bilateral knee replacement. Post-lumpectomy MMG in July benign with recommendations for bilateral in December which has been ordered. Otherwise she is doing well without any problems reported today. She is not  too happy about taking the Femara but has agreed to continue for now.      Past Medical History:   Diagnosis Date    Breast cancer     Hyperlipidemia     Hypertension     Seizures       Review of patient's allergies indicates:  No Known Allergies     Current Outpatient Medications:     atorvastatin (LIPITOR) 10 MG tablet, Take 10 mg by mouth once daily., Disp: , Rfl:     lacosamide (VIMPAT) 100 mg Tab, TAKE 1 TABLET BY MOUTH EVERY 12 HOURS, Disp: 60 tablet, Rfl: 5    letrozole (FEMARA) 2.5 mg Tab, TAKE 1 TABLET BY MOUTH EVERY DAY, Disp: 90 tablet, Rfl: 1    losartan (COZAAR) 100 MG tablet, Take 100 mg by mouth once daily., Disp: , Rfl:      Review of Systems   Constitutional:  Negative for appetite change and unexpected weight change.   HENT:  Negative for mouth sores.    Eyes:  Negative for visual disturbance.   Respiratory:  Negative for shortness of breath.    Cardiovascular:  Negative for chest pain.   Gastrointestinal:  Negative for abdominal pain and diarrhea.   Genitourinary:  Negative for frequency.   Musculoskeletal:  Negative for back pain.        Bilateral knee pains   Integumentary:  Negative for rash.   Neurological:  Positive for headaches.   Hematological:  Negative for adenopathy.   Psychiatric/Behavioral:  The patient is not nervous/anxious.        Vitals:    08/29/22 1345   BP: (!) 150/82   Pulse: 65   Resp: 14   Temp: 97.9 °F (36.6 °C)      Physical Exam  Vitals reviewed.   Constitutional:       Appearance: Normal appearance. She is overweight.   HENT:      Head: Normocephalic.   Eyes:      General: Lids are normal. Vision grossly intact.      Extraocular Movements: Extraocular movements intact.      Conjunctiva/sclera: Conjunctivae normal.   Cardiovascular:      Rate and Rhythm: Normal rate and regular rhythm.      Pulses: Normal pulses.      Heart sounds: Normal heart sounds, S1 normal and S2 normal.   Pulmonary:      Effort: Pulmonary effort is normal.      Breath sounds: Normal breath sounds.    Chest:      Comments: Left breast lumpectomy incision and axillary incision intact. No palpable masses or lesions bilaterally  Abdominal:      General: Abdomen is protuberant. Bowel sounds are normal.      Palpations: Abdomen is soft.   Musculoskeletal:      Cervical back: Normal range of motion and neck supple.      Right lower leg: No edema.      Left lower leg: No edema.   Feet:      Right foot:      Skin integrity: Skin integrity normal.   Skin:     General: Skin is warm and moist.      Capillary Refill: Capillary refill takes less than 2 seconds.   Neurological:      General: No focal deficit present.      Mental Status: She is alert and oriented to person, place, and time.   Psychiatric:         Attention and Perception: Attention normal.         Mood and Affect: Mood normal.         Speech: Speech normal.         Behavior: Behavior normal. Behavior is cooperative.         Cognition and Memory: Cognition normal.         Judgment: Judgment normal.     ECOG SCORE    0 - Fully active-able to carry on all pre-disease performance without restriction       Lab Visit on 08/26/2022   Component Date Value    Sodium Level 08/26/2022 140     Potassium Level 08/26/2022 4.5     Chloride 08/26/2022 106     Carbon Dioxide 08/26/2022 24     Glucose Level 08/26/2022 121 (H)     Blood Urea Nitrogen 08/26/2022 20.4 (H)     Creatinine 08/26/2022 0.76     Calcium Level Total 08/26/2022 9.9     Protein Total 08/26/2022 6.8     Albumin Level 08/26/2022 4.3     Globulin 08/26/2022 2.5     Albumin/Globulin Ratio 08/26/2022 1.7     Bilirubin Total 08/26/2022 0.6     Alkaline Phosphatase 08/26/2022 70     Alanine Aminotransferase 08/26/2022 17     Aspartate Aminotransfera* 08/26/2022 19     eGFR 08/26/2022 >60     WBC 08/26/2022 6.2     RBC 08/26/2022 4.98     Hgb 08/26/2022 13.6     Hct 08/26/2022 44.2     MCV 08/26/2022 88.8     MCH 08/26/2022 27.3     MCHC 08/26/2022 30.8 (L)     RDW 08/26/2022 13.5     Platelet 08/26/2022 219      MPV 08/26/2022 9.7     Neut % 08/26/2022 66.1     Lymph % 08/26/2022 23.2     Mono % 08/26/2022 8.1     Eos % 08/26/2022 1.8     Basophil % 08/26/2022 0.6     Lymph # 08/26/2022 1.43     Neut # 08/26/2022 4.1     Mono # 08/26/2022 0.50     Eos # 08/26/2022 0.11     Baso # 08/26/2022 0.04     IG# 08/26/2022 0.01     IG% 08/26/2022 0.2           Assessment:       Problem List Items Addressed This Visit          Oncology    Malignant neoplasm of central portion of female breast - Primary    Overview     left               Plan:     Patient with stage IA left breast cancer s/p lumpectomy done 1/7/22. Pathology showed 8mm IDCA, Grade 1 with clear margins, 2 negative SLNs. Tumor strongly ER and TX positive and Her2 negative.  Per NCCN guidelines, T1bN0 low grade tumors without LVI were not included in [facility] and are treated with endocrine therapy alone due to low risk.  Patient is meeting with radiation oncology next week and will likely be receiving post-operative radiation.  Dr. Woodson recommended treatment with AI x 5-10 years.    Completed adjuvant XRT x 4 weeks on 4/8/22.   Started Femara on 4/22/22. Tolerating with exception of headaches and worsening joint pains. She is willing to continue on the medication for now.   Patient did have DEXA in 11/2020 that showed osteopenia. Would need to start Fosamax vs. Prolia and plan to repeat DEXA in 11/2022. She would like to wait until after her DEXA scan before making a decision.   Continue Calcium + Vitamin D3 twice daily.   Continue routine surveillance visits every 3 months.   Left breast MMG on 7/5/22 benign with recommendations for bilateral in December 2022 which has been ordered by Dr. Painter.   All questions answered at this time.      Terry Howell AGUEDA

## 2022-08-26 ENCOUNTER — LAB VISIT (OUTPATIENT)
Dept: LAB | Facility: HOSPITAL | Age: 67
End: 2022-08-26
Attending: INTERNAL MEDICINE
Payer: COMMERCIAL

## 2022-08-26 DIAGNOSIS — C50.112 MALIGNANT NEOPLASM OF CENTRAL PORTION OF LEFT BREAST IN FEMALE, ESTROGEN RECEPTOR POSITIVE: ICD-10-CM

## 2022-08-26 DIAGNOSIS — Z17.0 MALIGNANT NEOPLASM OF CENTRAL PORTION OF LEFT BREAST IN FEMALE, ESTROGEN RECEPTOR POSITIVE: ICD-10-CM

## 2022-08-26 LAB
ALBUMIN SERPL-MCNC: 4.3 GM/DL (ref 3.4–4.8)
ALBUMIN/GLOB SERPL: 1.7 RATIO (ref 1.1–2)
ALP SERPL-CCNC: 70 UNIT/L (ref 40–150)
ALT SERPL-CCNC: 17 UNIT/L (ref 0–55)
AST SERPL-CCNC: 19 UNIT/L (ref 5–34)
BASOPHILS # BLD AUTO: 0.04 X10(3)/MCL (ref 0–0.2)
BASOPHILS NFR BLD AUTO: 0.6 %
BILIRUBIN DIRECT+TOT PNL SERPL-MCNC: 0.6 MG/DL
BUN SERPL-MCNC: 20.4 MG/DL (ref 9.8–20.1)
CALCIUM SERPL-MCNC: 9.9 MG/DL (ref 8.4–10.2)
CHLORIDE SERPL-SCNC: 106 MMOL/L (ref 98–107)
CO2 SERPL-SCNC: 24 MMOL/L (ref 23–31)
CREAT SERPL-MCNC: 0.76 MG/DL (ref 0.55–1.02)
EOSINOPHIL # BLD AUTO: 0.11 X10(3)/MCL (ref 0–0.9)
EOSINOPHIL NFR BLD AUTO: 1.8 %
ERYTHROCYTE [DISTWIDTH] IN BLOOD BY AUTOMATED COUNT: 13.5 % (ref 11.5–17)
GFR SERPLBLD CREATININE-BSD FMLA CKD-EPI: >60 MLS/MIN/1.73/M2
GLOBULIN SER-MCNC: 2.5 GM/DL (ref 2.4–3.5)
GLUCOSE SERPL-MCNC: 121 MG/DL (ref 82–115)
HCT VFR BLD AUTO: 44.2 % (ref 37–47)
HGB BLD-MCNC: 13.6 GM/DL (ref 12–16)
IMM GRANULOCYTES # BLD AUTO: 0.01 X10(3)/MCL (ref 0–0.04)
IMM GRANULOCYTES NFR BLD AUTO: 0.2 %
LYMPHOCYTES # BLD AUTO: 1.43 X10(3)/MCL (ref 0.6–4.6)
LYMPHOCYTES NFR BLD AUTO: 23.2 %
MCH RBC QN AUTO: 27.3 PG (ref 27–31)
MCHC RBC AUTO-ENTMCNC: 30.8 MG/DL (ref 33–36)
MCV RBC AUTO: 88.8 FL (ref 80–94)
MONOCYTES # BLD AUTO: 0.5 X10(3)/MCL (ref 0.1–1.3)
MONOCYTES NFR BLD AUTO: 8.1 %
NEUTROPHILS # BLD AUTO: 4.1 X10(3)/MCL (ref 2.1–9.2)
NEUTROPHILS NFR BLD AUTO: 66.1 %
PLATELET # BLD AUTO: 219 X10(3)/MCL (ref 130–400)
PMV BLD AUTO: 9.7 FL (ref 7.4–10.4)
POTASSIUM SERPL-SCNC: 4.5 MMOL/L (ref 3.5–5.1)
PROT SERPL-MCNC: 6.8 GM/DL (ref 5.8–7.6)
RBC # BLD AUTO: 4.98 X10(6)/MCL (ref 4.2–5.4)
SODIUM SERPL-SCNC: 140 MMOL/L (ref 136–145)
WBC # SPEC AUTO: 6.2 X10(3)/MCL (ref 4.5–11.5)

## 2022-08-26 PROCEDURE — 80053 COMPREHEN METABOLIC PANEL: CPT

## 2022-08-26 PROCEDURE — 36415 COLL VENOUS BLD VENIPUNCTURE: CPT

## 2022-08-26 PROCEDURE — 85025 COMPLETE CBC W/AUTO DIFF WBC: CPT

## 2022-08-29 ENCOUNTER — OFFICE VISIT (OUTPATIENT)
Dept: HEMATOLOGY/ONCOLOGY | Facility: CLINIC | Age: 67
End: 2022-08-29
Payer: COMMERCIAL

## 2022-08-29 VITALS
WEIGHT: 246.69 LBS | BODY MASS INDEX: 35.32 KG/M2 | RESPIRATION RATE: 14 BRPM | DIASTOLIC BLOOD PRESSURE: 82 MMHG | OXYGEN SATURATION: 99 % | HEIGHT: 70 IN | SYSTOLIC BLOOD PRESSURE: 150 MMHG | HEART RATE: 65 BPM | TEMPERATURE: 98 F

## 2022-08-29 DIAGNOSIS — N95.1 POST MENOPAUSAL SYNDROME: ICD-10-CM

## 2022-08-29 DIAGNOSIS — C50.111 MALIGNANT NEOPLASM OF CENTRAL PORTION OF RIGHT BREAST IN FEMALE, ESTROGEN RECEPTOR POSITIVE: Primary | ICD-10-CM

## 2022-08-29 DIAGNOSIS — Z17.0 MALIGNANT NEOPLASM OF CENTRAL PORTION OF RIGHT BREAST IN FEMALE, ESTROGEN RECEPTOR POSITIVE: Primary | ICD-10-CM

## 2022-08-29 PROCEDURE — 1101F PT FALLS ASSESS-DOCD LE1/YR: CPT | Mod: CPTII,S$GLB,, | Performed by: NURSE PRACTITIONER

## 2022-08-29 PROCEDURE — 1160F RVW MEDS BY RX/DR IN RCRD: CPT | Mod: CPTII,S$GLB,, | Performed by: NURSE PRACTITIONER

## 2022-08-29 PROCEDURE — 3288F PR FALLS RISK ASSESSMENT DOCUMENTED: ICD-10-PCS | Mod: CPTII,S$GLB,, | Performed by: NURSE PRACTITIONER

## 2022-08-29 PROCEDURE — 3079F PR MOST RECENT DIASTOLIC BLOOD PRESSURE 80-89 MM HG: ICD-10-PCS | Mod: CPTII,S$GLB,, | Performed by: NURSE PRACTITIONER

## 2022-08-29 PROCEDURE — 4010F PR ACE/ARB THEARPY RXD/TAKEN: ICD-10-PCS | Mod: CPTII,S$GLB,, | Performed by: NURSE PRACTITIONER

## 2022-08-29 PROCEDURE — 1125F PR PAIN SEVERITY QUANTIFIED, PAIN PRESENT: ICD-10-PCS | Mod: CPTII,S$GLB,, | Performed by: NURSE PRACTITIONER

## 2022-08-29 PROCEDURE — 99214 PR OFFICE/OUTPT VISIT, EST, LEVL IV, 30-39 MIN: ICD-10-PCS | Mod: S$GLB,,, | Performed by: NURSE PRACTITIONER

## 2022-08-29 PROCEDURE — 99214 OFFICE O/P EST MOD 30 MIN: CPT | Mod: S$GLB,,, | Performed by: NURSE PRACTITIONER

## 2022-08-29 PROCEDURE — 1159F MED LIST DOCD IN RCRD: CPT | Mod: CPTII,S$GLB,, | Performed by: NURSE PRACTITIONER

## 2022-08-29 PROCEDURE — 3079F DIAST BP 80-89 MM HG: CPT | Mod: CPTII,S$GLB,, | Performed by: NURSE PRACTITIONER

## 2022-08-29 PROCEDURE — 3008F PR BODY MASS INDEX (BMI) DOCUMENTED: ICD-10-PCS | Mod: CPTII,S$GLB,, | Performed by: NURSE PRACTITIONER

## 2022-08-29 PROCEDURE — 3288F FALL RISK ASSESSMENT DOCD: CPT | Mod: CPTII,S$GLB,, | Performed by: NURSE PRACTITIONER

## 2022-08-29 PROCEDURE — 3008F BODY MASS INDEX DOCD: CPT | Mod: CPTII,S$GLB,, | Performed by: NURSE PRACTITIONER

## 2022-08-29 PROCEDURE — 1160F PR REVIEW ALL MEDS BY PRESCRIBER/CLIN PHARMACIST DOCUMENTED: ICD-10-PCS | Mod: CPTII,S$GLB,, | Performed by: NURSE PRACTITIONER

## 2022-08-29 PROCEDURE — 99999 PR PBB SHADOW E&M-EST. PATIENT-LVL IV: CPT | Mod: PBBFAC,,, | Performed by: NURSE PRACTITIONER

## 2022-08-29 PROCEDURE — 1159F PR MEDICATION LIST DOCUMENTED IN MEDICAL RECORD: ICD-10-PCS | Mod: CPTII,S$GLB,, | Performed by: NURSE PRACTITIONER

## 2022-08-29 PROCEDURE — 4010F ACE/ARB THERAPY RXD/TAKEN: CPT | Mod: CPTII,S$GLB,, | Performed by: NURSE PRACTITIONER

## 2022-08-29 PROCEDURE — 99999 PR PBB SHADOW E&M-EST. PATIENT-LVL IV: ICD-10-PCS | Mod: PBBFAC,,, | Performed by: NURSE PRACTITIONER

## 2022-08-29 PROCEDURE — 3077F SYST BP >= 140 MM HG: CPT | Mod: CPTII,S$GLB,, | Performed by: NURSE PRACTITIONER

## 2022-08-29 PROCEDURE — 1101F PR PT FALLS ASSESS DOC 0-1 FALLS W/OUT INJ PAST YR: ICD-10-PCS | Mod: CPTII,S$GLB,, | Performed by: NURSE PRACTITIONER

## 2022-08-29 PROCEDURE — 1125F AMNT PAIN NOTED PAIN PRSNT: CPT | Mod: CPTII,S$GLB,, | Performed by: NURSE PRACTITIONER

## 2022-08-29 PROCEDURE — 3077F PR MOST RECENT SYSTOLIC BLOOD PRESSURE >= 140 MM HG: ICD-10-PCS | Mod: CPTII,S$GLB,, | Performed by: NURSE PRACTITIONER

## 2022-10-19 ENCOUNTER — TELEPHONE (OUTPATIENT)
Dept: HEMATOLOGY/ONCOLOGY | Facility: CLINIC | Age: 67
End: 2022-10-19
Payer: COMMERCIAL

## 2022-10-19 DIAGNOSIS — Z17.0 MALIGNANT NEOPLASM OF CENTRAL PORTION OF RIGHT BREAST IN FEMALE, ESTROGEN RECEPTOR POSITIVE: Primary | ICD-10-CM

## 2022-10-19 DIAGNOSIS — C50.111 MALIGNANT NEOPLASM OF CENTRAL PORTION OF RIGHT BREAST IN FEMALE, ESTROGEN RECEPTOR POSITIVE: Primary | ICD-10-CM

## 2022-10-19 RX ORDER — ANASTROZOLE 1 MG/1
1 TABLET ORAL DAILY
Qty: 30 TABLET | Refills: 3 | Status: SHIPPED | OUTPATIENT
Start: 2022-10-19 | End: 2023-01-17

## 2022-10-19 NOTE — TELEPHONE ENCOUNTER
Patient called stating that she was having joint pain from Femara and was asking to change hormone blocker.  She also stated that anastrozole was much cheaper for her with her insurance.  Dr. Woodson notified.  Patient instructed to stop Femara for one week and then start Anastrozole. Anastrozole rx sent to patient's pharmacy.  Patient states understanding.

## 2022-12-05 ENCOUNTER — HOSPITAL ENCOUNTER (OUTPATIENT)
Dept: RADIOLOGY | Facility: HOSPITAL | Age: 67
Discharge: HOME | End: 2022-12-05
Attending: NURSE PRACTITIONER
Payer: COMMERCIAL

## 2022-12-05 ENCOUNTER — HOSPITAL ENCOUNTER (OUTPATIENT)
Dept: RADIOLOGY | Facility: HOSPITAL | Age: 67
Discharge: HOME | End: 2022-12-05
Attending: PHYSICIAN ASSISTANT
Payer: COMMERCIAL

## 2022-12-05 DIAGNOSIS — N95.1 POST MENOPAUSAL SYNDROME: ICD-10-CM

## 2022-12-05 DIAGNOSIS — Z17.0 MALIGNANT NEOPLASM OF CENTRAL PORTION OF RIGHT BREAST IN FEMALE, ESTROGEN RECEPTOR POSITIVE: ICD-10-CM

## 2022-12-05 DIAGNOSIS — C50.111 MALIGNANT NEOPLASM OF CENTRAL PORTION OF RIGHT BREAST IN FEMALE, ESTROGEN RECEPTOR POSITIVE: ICD-10-CM

## 2022-12-05 PROCEDURE — 77080 DXA BONE DENSITY AXIAL: CPT | Mod: 26,,, | Performed by: STUDENT IN AN ORGANIZED HEALTH CARE EDUCATION/TRAINING PROGRAM

## 2022-12-05 PROCEDURE — 77080 DEXA BONE DENSITY SPINE HIP: ICD-10-PCS | Mod: 26,,, | Performed by: STUDENT IN AN ORGANIZED HEALTH CARE EDUCATION/TRAINING PROGRAM

## 2022-12-05 PROCEDURE — 77066 DX MAMMO INCL CAD BI: CPT | Mod: 26,,, | Performed by: RADIOLOGY

## 2022-12-05 PROCEDURE — 77062 MAMMO DIGITAL DIAGNOSTIC BILAT WITH TOMO: ICD-10-PCS | Mod: 26,,, | Performed by: RADIOLOGY

## 2022-12-05 PROCEDURE — 77066 MAMMO DIGITAL DIAGNOSTIC BILAT WITH TOMO: ICD-10-PCS | Mod: 26,,, | Performed by: RADIOLOGY

## 2022-12-05 PROCEDURE — 77062 BREAST TOMOSYNTHESIS BI: CPT | Mod: TC

## 2022-12-05 PROCEDURE — 77062 BREAST TOMOSYNTHESIS BI: CPT | Mod: 26,,, | Performed by: RADIOLOGY

## 2022-12-05 PROCEDURE — 77080 DXA BONE DENSITY AXIAL: CPT | Mod: TC

## 2022-12-06 PROBLEM — C50.112 MALIGNANT NEOPLASM OF CENTRAL PORTION OF LEFT BREAST IN FEMALE, ESTROGEN RECEPTOR POSITIVE: Status: ACTIVE | Noted: 2022-05-23

## 2022-12-06 PROBLEM — Z17.0 MALIGNANT NEOPLASM OF CENTRAL PORTION OF LEFT BREAST IN FEMALE, ESTROGEN RECEPTOR POSITIVE: Status: ACTIVE | Noted: 2022-05-23

## 2022-12-06 NOTE — PROGRESS NOTES
Subjective:       Patient ID: Yoli Burnett is a 67 y.o. female.    Surgeon: Dr. Mary Painter  PCP: Dr. Valerie Quispe    Left Breast Cancer Stage IA ([id number])--Diagnosed 21  Biopsy/pathology: Left breast 11:00 mass biopsy done 21--invasive carcinoma with mixed ductal and lobular features, Grade 1, carcinoma present on 5 cores and measures at least 7mm, ER 95%, [place] 96%, Her 2 0 by IHC, negative, Ki67 low 5%.   Surgery/pathology: Left breast lumpectomy with SLN biopsy done 22--invasive ductal carcinoma 8mm, Grade 1 with organizing biopsy site changes, no LVI, no DCIS, resection margin clear, proliferative fibrocystic changes with focal atypical lobular hyperplasia and LCIS, 2 SLNs negative.   Imagin. Screening MMG bilateral 21--focal asymmetry with possible architectural distortion in the left beast UIQ middle depth needs further evaluation, BIRADS 0.   2. Left Diagnostic MMG/US done 21--irregular, spiculated mass in 11:00 left breast, posterior depth, highly suggestive of malignancy, measures 11mm, BIRADS 5.     DEXA:  2020--AP spine T= -0.3, Femur neck left -2.1, right -1.8, total femur left -2.1, right -2.2 c/w osteopenia.   2022--AP spine T= 0.8, Femur neck left -2.3, right -1.9, total femur left -2.3, right -1.9, total femur right -2.1 c/w osteopenia, some improved, some worse.    Treatment History:  Adjuvant XRT x 4 weeks, completed on 22.     Current treatment plan:   1. Femara started on 22, changed to Arimidex 10/19/22 for HA    Chief Complaint: Other Misc (Pt reports no new concerns today.)      HPI   The patient presents today for scheduled follow-up for her breast cancer.  She is doing better after change to the Armidex. No more HA. Still has the joint pains in her knees and is planning to have double knee replacement in the summer. She is otherwise doing okay. She reports having taking Fosamax and 1 dose of Prolia in the past with Dr. Lieberman, but  then she retired and it was stopped. Recent DEXA shows ongoing osteopenia.    Past Medical History:   Diagnosis Date    Breast cancer     Hyperlipidemia     Hypertension     Seizures       Review of patient's allergies indicates:  No Known Allergies     Current Outpatient Medications:     anastrozole (ARIMIDEX) 1 mg Tab, Take 1 tablet (1 mg total) by mouth once daily., Disp: 30 tablet, Rfl: 3    atorvastatin (LIPITOR) 10 MG tablet, Take 10 mg by mouth once daily., Disp: , Rfl:     lacosamide (VIMPAT) 100 mg Tab, TAKE 1 TABLET BY MOUTH EVERY 12 HOURS, Disp: 60 tablet, Rfl: 5    losartan (COZAAR) 100 MG tablet, Take 100 mg by mouth once daily., Disp: , Rfl:     letrozole (FEMARA) 2.5 mg Tab, TAKE 1 TABLET BY MOUTH EVERY DAY, Disp: 90 tablet, Rfl: 1     Review of Systems   Constitutional:  Negative for appetite change and unexpected weight change.   HENT:  Negative for mouth sores.    Eyes:  Negative for visual disturbance.   Respiratory:  Negative for cough and shortness of breath.    Cardiovascular:  Negative for chest pain.   Gastrointestinal:  Negative for abdominal pain and diarrhea.   Genitourinary:  Negative for frequency.   Musculoskeletal:  Negative for back pain.        Bilateral knee pains   Integumentary:  Negative for rash.   Neurological:  Negative for headaches.   Hematological:  Negative for adenopathy.   Psychiatric/Behavioral:  The patient is not nervous/anxious.        Vitals:    12/12/22 0956   BP: 132/77   Pulse: 82   Resp: 14   Temp: 98.2 °F (36.8 °C)        Physical Exam  Vitals reviewed.   Constitutional:       Appearance: Normal appearance. She is overweight.   HENT:      Head: Normocephalic.   Eyes:      General: Lids are normal. Vision grossly intact.      Extraocular Movements: Extraocular movements intact.      Conjunctiva/sclera: Conjunctivae normal.   Cardiovascular:      Rate and Rhythm: Normal rate and regular rhythm.      Pulses: Normal pulses.      Heart sounds: Normal heart sounds,  S1 normal and S2 normal.   Pulmonary:      Effort: Pulmonary effort is normal.      Breath sounds: Normal breath sounds.   Chest:      Comments: Left breast lumpectomy incision and axillary incision intact. No palpable masses or lesions bilaterally  Abdominal:      General: Abdomen is protuberant. Bowel sounds are normal.      Palpations: Abdomen is soft.   Musculoskeletal:      Cervical back: Normal range of motion and neck supple.      Right lower leg: No edema.      Left lower leg: No edema.   Feet:      Right foot:      Skin integrity: Skin integrity normal.   Skin:     General: Skin is warm and moist.      Capillary Refill: Capillary refill takes less than 2 seconds.   Neurological:      General: No focal deficit present.      Mental Status: She is alert and oriented to person, place, and time.   Psychiatric:         Attention and Perception: Attention normal.         Mood and Affect: Mood normal.         Speech: Speech normal.         Behavior: Behavior normal. Behavior is cooperative.         Cognition and Memory: Cognition normal.         Judgment: Judgment normal.     ECOG SCORE           No visits with results within 1 Week(s) from this visit.   Latest known visit with results is:   Lab Visit on 12/05/2022   Component Date Value    Sodium Level 12/05/2022 142     Potassium Level 12/05/2022 4.8     Chloride 12/05/2022 105     Carbon Dioxide 12/05/2022 27     Glucose Level 12/05/2022 108     Blood Urea Nitrogen 12/05/2022 19.7     Creatinine 12/05/2022 0.86     Calcium Level Total 12/05/2022 9.6     Protein Total 12/05/2022 6.6     Albumin Level 12/05/2022 4.2     Globulin 12/05/2022 2.4     Albumin/Globulin Ratio 12/05/2022 1.8     Bilirubin Total 12/05/2022 0.8     Alkaline Phosphatase 12/05/2022 77     Alanine Aminotransferase 12/05/2022 17     Aspartate Aminotransfera* 12/05/2022 18     eGFR 12/05/2022 >60     WBC 12/05/2022 7.2     RBC 12/05/2022 4.97     Hgb 12/05/2022 13.5     Hct 12/05/2022 44.1      MCV 12/05/2022 88.7     MCH 12/05/2022 27.2     MCHC 12/05/2022 30.6 (L)     RDW 12/05/2022 13.8     Platelet 12/05/2022 223     MPV 12/05/2022 9.9     Neut % 12/05/2022 61.1     Lymph % 12/05/2022 28.4     Mono % 12/05/2022 7.4     Eos % 12/05/2022 2.1     Basophil % 12/05/2022 0.4     Lymph # 12/05/2022 2.04     Neut # 12/05/2022 4.4     Mono # 12/05/2022 0.53     Eos # 12/05/2022 0.15     Baso # 12/05/2022 0.03     IG# 12/05/2022 0.04     IG% 12/05/2022 0.6           Assessment:       1. Malignant neoplasm of central portion of left breast in female, estrogen receptor positive       Plan:     Patient with stage IA left breast cancer s/p lumpectomy done 1/7/22. Pathology showed 8mm IDCA, Grade 1 with clear margins, 2 negative SLNs. Tumor strongly ER and RI positive and Her2 negative.  Per NCCN guidelines, T1bN0 low grade tumors without LVI were not included in [employer] and are treated with endocrine therapy alone due to low risk.  Patient completed adjuvant radiation 4/8/22.  Recommended treatment with AI x 5-10 years.    Started Femara on 4/22/22. Changed to Arimidex in 10/2022 for HA and she is tolerating better.  Currently patient is doing well without any signs or symptoms to suggest disease recurrence.  Recent labs all good.   Patient did have DEXA in 11/2020 that showed osteopenia.   Repeat DEXA 12/5/22 with ongoing osteopenia. Discussed Fosamax vs. Prolia.  Will start Fosamax. Plan to repeat DEXA in 12/2025.    Continue Arimidex.  Continue Calcium + Vitamin D3 twice daily.   Continue routine surveillance visits every 3 months.   RTC 3 months for follow-up with repeat labs.   Bilateral MMG 12/5/22 benign. Plan to repeat in 1 year--ordered today.    All questions answered at this time.      Keisha Woodson MD

## 2022-12-12 ENCOUNTER — OFFICE VISIT (OUTPATIENT)
Dept: HEMATOLOGY/ONCOLOGY | Facility: CLINIC | Age: 67
End: 2022-12-12
Payer: COMMERCIAL

## 2022-12-12 VITALS
WEIGHT: 252.5 LBS | TEMPERATURE: 98 F | HEART RATE: 82 BPM | HEIGHT: 70 IN | SYSTOLIC BLOOD PRESSURE: 132 MMHG | BODY MASS INDEX: 36.15 KG/M2 | DIASTOLIC BLOOD PRESSURE: 77 MMHG | OXYGEN SATURATION: 94 % | RESPIRATION RATE: 14 BRPM

## 2022-12-12 DIAGNOSIS — Z17.0 MALIGNANT NEOPLASM OF CENTRAL PORTION OF LEFT BREAST IN FEMALE, ESTROGEN RECEPTOR POSITIVE: Primary | ICD-10-CM

## 2022-12-12 DIAGNOSIS — C50.112 MALIGNANT NEOPLASM OF CENTRAL PORTION OF LEFT BREAST IN FEMALE, ESTROGEN RECEPTOR POSITIVE: Primary | ICD-10-CM

## 2022-12-12 PROCEDURE — 3288F PR FALLS RISK ASSESSMENT DOCUMENTED: ICD-10-PCS | Mod: CPTII,S$GLB,, | Performed by: INTERNAL MEDICINE

## 2022-12-12 PROCEDURE — 1159F MED LIST DOCD IN RCRD: CPT | Mod: CPTII,S$GLB,, | Performed by: INTERNAL MEDICINE

## 2022-12-12 PROCEDURE — 3075F SYST BP GE 130 - 139MM HG: CPT | Mod: CPTII,S$GLB,, | Performed by: INTERNAL MEDICINE

## 2022-12-12 PROCEDURE — 1159F PR MEDICATION LIST DOCUMENTED IN MEDICAL RECORD: ICD-10-PCS | Mod: CPTII,S$GLB,, | Performed by: INTERNAL MEDICINE

## 2022-12-12 PROCEDURE — 99999 PR PBB SHADOW E&M-EST. PATIENT-LVL IV: ICD-10-PCS | Mod: PBBFAC,,, | Performed by: INTERNAL MEDICINE

## 2022-12-12 PROCEDURE — 1126F PR PAIN SEVERITY QUANTIFIED, NO PAIN PRESENT: ICD-10-PCS | Mod: CPTII,S$GLB,, | Performed by: INTERNAL MEDICINE

## 2022-12-12 PROCEDURE — 1101F PR PT FALLS ASSESS DOC 0-1 FALLS W/OUT INJ PAST YR: ICD-10-PCS | Mod: CPTII,S$GLB,, | Performed by: INTERNAL MEDICINE

## 2022-12-12 PROCEDURE — 99214 PR OFFICE/OUTPT VISIT, EST, LEVL IV, 30-39 MIN: ICD-10-PCS | Mod: S$GLB,,, | Performed by: INTERNAL MEDICINE

## 2022-12-12 PROCEDURE — 3075F PR MOST RECENT SYSTOLIC BLOOD PRESS GE 130-139MM HG: ICD-10-PCS | Mod: CPTII,S$GLB,, | Performed by: INTERNAL MEDICINE

## 2022-12-12 PROCEDURE — 99999 PR PBB SHADOW E&M-EST. PATIENT-LVL IV: CPT | Mod: PBBFAC,,, | Performed by: INTERNAL MEDICINE

## 2022-12-12 PROCEDURE — 1101F PT FALLS ASSESS-DOCD LE1/YR: CPT | Mod: CPTII,S$GLB,, | Performed by: INTERNAL MEDICINE

## 2022-12-12 PROCEDURE — 1160F PR REVIEW ALL MEDS BY PRESCRIBER/CLIN PHARMACIST DOCUMENTED: ICD-10-PCS | Mod: CPTII,S$GLB,, | Performed by: INTERNAL MEDICINE

## 2022-12-12 PROCEDURE — 1160F RVW MEDS BY RX/DR IN RCRD: CPT | Mod: CPTII,S$GLB,, | Performed by: INTERNAL MEDICINE

## 2022-12-12 PROCEDURE — 3288F FALL RISK ASSESSMENT DOCD: CPT | Mod: CPTII,S$GLB,, | Performed by: INTERNAL MEDICINE

## 2022-12-12 PROCEDURE — 3078F PR MOST RECENT DIASTOLIC BLOOD PRESSURE < 80 MM HG: ICD-10-PCS | Mod: CPTII,S$GLB,, | Performed by: INTERNAL MEDICINE

## 2022-12-12 PROCEDURE — 99214 OFFICE O/P EST MOD 30 MIN: CPT | Mod: S$GLB,,, | Performed by: INTERNAL MEDICINE

## 2022-12-12 PROCEDURE — 1126F AMNT PAIN NOTED NONE PRSNT: CPT | Mod: CPTII,S$GLB,, | Performed by: INTERNAL MEDICINE

## 2022-12-12 PROCEDURE — 3008F BODY MASS INDEX DOCD: CPT | Mod: CPTII,S$GLB,, | Performed by: INTERNAL MEDICINE

## 2022-12-12 PROCEDURE — 3008F PR BODY MASS INDEX (BMI) DOCUMENTED: ICD-10-PCS | Mod: CPTII,S$GLB,, | Performed by: INTERNAL MEDICINE

## 2022-12-12 PROCEDURE — 4010F PR ACE/ARB THEARPY RXD/TAKEN: ICD-10-PCS | Mod: CPTII,S$GLB,, | Performed by: INTERNAL MEDICINE

## 2022-12-12 PROCEDURE — 3078F DIAST BP <80 MM HG: CPT | Mod: CPTII,S$GLB,, | Performed by: INTERNAL MEDICINE

## 2022-12-12 PROCEDURE — 4010F ACE/ARB THERAPY RXD/TAKEN: CPT | Mod: CPTII,S$GLB,, | Performed by: INTERNAL MEDICINE

## 2022-12-12 RX ORDER — ALENDRONATE SODIUM 70 MG/1
70 TABLET ORAL
Qty: 4 TABLET | Refills: 11 | Status: SHIPPED | OUTPATIENT
Start: 2022-12-12 | End: 2023-11-02

## 2022-12-13 ENCOUNTER — TELEPHONE (OUTPATIENT)
Dept: HEMATOLOGY/ONCOLOGY | Facility: CLINIC | Age: 67
End: 2022-12-13
Payer: COMMERCIAL

## 2022-12-15 ENCOUNTER — OFFICE VISIT (OUTPATIENT)
Dept: NEUROLOGY | Facility: CLINIC | Age: 67
End: 2022-12-15
Payer: COMMERCIAL

## 2022-12-15 VITALS
DIASTOLIC BLOOD PRESSURE: 88 MMHG | HEIGHT: 67 IN | SYSTOLIC BLOOD PRESSURE: 140 MMHG | WEIGHT: 256 LBS | BODY MASS INDEX: 40.18 KG/M2 | HEART RATE: 80 BPM

## 2022-12-15 DIAGNOSIS — Z86.69 HISTORY OF COMPLEX PARTIAL EPILEPSY: Primary | ICD-10-CM

## 2022-12-15 PROCEDURE — 1160F RVW MEDS BY RX/DR IN RCRD: CPT | Mod: CPTII,S$GLB,, | Performed by: PSYCHIATRY & NEUROLOGY

## 2022-12-15 PROCEDURE — 99213 PR OFFICE/OUTPT VISIT, EST, LEVL III, 20-29 MIN: ICD-10-PCS | Mod: S$GLB,,, | Performed by: PSYCHIATRY & NEUROLOGY

## 2022-12-15 PROCEDURE — 1101F PR PT FALLS ASSESS DOC 0-1 FALLS W/OUT INJ PAST YR: ICD-10-PCS | Mod: CPTII,S$GLB,, | Performed by: PSYCHIATRY & NEUROLOGY

## 2022-12-15 PROCEDURE — 1101F PT FALLS ASSESS-DOCD LE1/YR: CPT | Mod: CPTII,S$GLB,, | Performed by: PSYCHIATRY & NEUROLOGY

## 2022-12-15 PROCEDURE — 1126F PR PAIN SEVERITY QUANTIFIED, NO PAIN PRESENT: ICD-10-PCS | Mod: CPTII,S$GLB,, | Performed by: PSYCHIATRY & NEUROLOGY

## 2022-12-15 PROCEDURE — 99213 OFFICE O/P EST LOW 20 MIN: CPT | Mod: S$GLB,,, | Performed by: PSYCHIATRY & NEUROLOGY

## 2022-12-15 PROCEDURE — 3008F PR BODY MASS INDEX (BMI) DOCUMENTED: ICD-10-PCS | Mod: CPTII,S$GLB,, | Performed by: PSYCHIATRY & NEUROLOGY

## 2022-12-15 PROCEDURE — 3079F PR MOST RECENT DIASTOLIC BLOOD PRESSURE 80-89 MM HG: ICD-10-PCS | Mod: CPTII,S$GLB,, | Performed by: PSYCHIATRY & NEUROLOGY

## 2022-12-15 PROCEDURE — 99999 PR PBB SHADOW E&M-EST. PATIENT-LVL IV: ICD-10-PCS | Mod: PBBFAC,,, | Performed by: PSYCHIATRY & NEUROLOGY

## 2022-12-15 PROCEDURE — 3288F FALL RISK ASSESSMENT DOCD: CPT | Mod: CPTII,S$GLB,, | Performed by: PSYCHIATRY & NEUROLOGY

## 2022-12-15 PROCEDURE — 3008F BODY MASS INDEX DOCD: CPT | Mod: CPTII,S$GLB,, | Performed by: PSYCHIATRY & NEUROLOGY

## 2022-12-15 PROCEDURE — 3077F PR MOST RECENT SYSTOLIC BLOOD PRESSURE >= 140 MM HG: ICD-10-PCS | Mod: CPTII,S$GLB,, | Performed by: PSYCHIATRY & NEUROLOGY

## 2022-12-15 PROCEDURE — 1159F PR MEDICATION LIST DOCUMENTED IN MEDICAL RECORD: ICD-10-PCS | Mod: CPTII,S$GLB,, | Performed by: PSYCHIATRY & NEUROLOGY

## 2022-12-15 PROCEDURE — 3077F SYST BP >= 140 MM HG: CPT | Mod: CPTII,S$GLB,, | Performed by: PSYCHIATRY & NEUROLOGY

## 2022-12-15 PROCEDURE — 99999 PR PBB SHADOW E&M-EST. PATIENT-LVL IV: CPT | Mod: PBBFAC,,, | Performed by: PSYCHIATRY & NEUROLOGY

## 2022-12-15 PROCEDURE — 1159F MED LIST DOCD IN RCRD: CPT | Mod: CPTII,S$GLB,, | Performed by: PSYCHIATRY & NEUROLOGY

## 2022-12-15 PROCEDURE — 4010F PR ACE/ARB THEARPY RXD/TAKEN: ICD-10-PCS | Mod: CPTII,S$GLB,, | Performed by: PSYCHIATRY & NEUROLOGY

## 2022-12-15 PROCEDURE — 1160F PR REVIEW ALL MEDS BY PRESCRIBER/CLIN PHARMACIST DOCUMENTED: ICD-10-PCS | Mod: CPTII,S$GLB,, | Performed by: PSYCHIATRY & NEUROLOGY

## 2022-12-15 PROCEDURE — 3288F PR FALLS RISK ASSESSMENT DOCUMENTED: ICD-10-PCS | Mod: CPTII,S$GLB,, | Performed by: PSYCHIATRY & NEUROLOGY

## 2022-12-15 PROCEDURE — 1126F AMNT PAIN NOTED NONE PRSNT: CPT | Mod: CPTII,S$GLB,, | Performed by: PSYCHIATRY & NEUROLOGY

## 2022-12-15 PROCEDURE — 3079F DIAST BP 80-89 MM HG: CPT | Mod: CPTII,S$GLB,, | Performed by: PSYCHIATRY & NEUROLOGY

## 2022-12-15 PROCEDURE — 4010F ACE/ARB THERAPY RXD/TAKEN: CPT | Mod: CPTII,S$GLB,, | Performed by: PSYCHIATRY & NEUROLOGY

## 2022-12-15 RX ORDER — IBUPROFEN 200 MG
200 TABLET ORAL EVERY 6 HOURS PRN
COMMUNITY
End: 2023-07-05

## 2022-12-15 RX ORDER — LACOSAMIDE 100 MG/1
100 TABLET ORAL EVERY 12 HOURS
Qty: 60 TABLET | Refills: 5 | Status: SHIPPED | OUTPATIENT
Start: 2022-12-15 | End: 2023-01-17 | Stop reason: SDUPTHER

## 2022-12-15 NOTE — PROGRESS NOTES
Chief Complaint   Patient presents with    Seizures     Denies any seizure activity. Every once in a while has some vertigo.        This is a 67 y.o. female here for follow up for seizures. She is doing well, denies seizures. S/p partial mastectomy. On vimpat 100 BID no side effects, rare vertigo.     Medication List with Changes/Refills   Current Medications    ALENDRONATE (FOSAMAX) 70 MG TABLET    Take 1 tablet (70 mg total) by mouth every 7 days.    ANASTROZOLE (ARIMIDEX) 1 MG TAB    Take 1 tablet (1 mg total) by mouth once daily.    ATORVASTATIN (LIPITOR) 10 MG TABLET    Take 10 mg by mouth once daily.    IBUPROFEN (ADVIL,MOTRIN) 200 MG TABLET    Take 200 mg by mouth every 6 (six) hours as needed for Pain.    LOSARTAN (COZAAR) 100 MG TABLET    Take 100 mg by mouth once daily.   Changed and/or Refilled Medications    Modified Medication Previous Medication    LACOSAMIDE (VIMPAT) 100 MG TAB lacosamide (VIMPAT) 100 mg Tab       Take 1 tablet (100 mg total) by mouth every 12 (twelve) hours.    TAKE 1 TABLET BY MOUTH EVERY 12 HOURS        Vitals:    12/15/22 0840   BP: (!) 140/88   Pulse: 80      NAD  Alert and oriented  Cognition and perception intact  No aphasia  EOMI  No facial asymmetry  No dysarthria  Moves all extremities symmetrically  No gross coordination abnormalities  Gait normal       1. History of complex partial epilepsy  -     MRI Brain W WO Contrast; Future; Expected date: 12/15/2022  -     lacosamide (VIMPAT) 100 mg Tab; Take 1 tablet (100 mg total) by mouth every 12 (twelve) hours.  Dispense: 60 tablet; Refill: 5       Cont vimpat 100 BID  MRI for surveillance meningioma recurrence

## 2022-12-29 NOTE — PROGRESS NOTES
[facility] Breast Surg  Breast Surgical Oncology  Follow-Up Patient Office Visit       Referring Provider: No ref. provider found   PCP: Leslie Quispe MD   Care Team:   Medical Oncologist: No care team member to display   Radiation Oncologist: No care team member to display   OBGYN: No data on file.     Chief Complaint:   Chief Complaint   Patient presents with    Follow-up     6 month followup, patient states that she's doing good no breast changes, no signs of infection, last Bilateral Diagnostic Mammogram/BMD done on 12/5/22 and MRI Bilateral Breast done on 12/23/22      Subjective:      Cancer Staging   Malignant neoplasm of central portion of left breast in female, estrogen receptor positive  Staging form: Breast, AJCC 8th Edition  - Pathologic stage from 12/9/2021: Stage IA (pT1b, pN0(sn), cM0, G1, ER+, TN+, HER2-) - Signed by JL Darnell on 5/30/2022    Treatment History:  1. S/P left breast partial mastectomy and SLNB 1/7/2022  2. Adjuvant XRT x 4 weeks, completed on 4/8/22.   3. AI expected X 5-10 years. Femara started on 4/22/22. Changed to Arimidex 10/19/22 for HA    Interval History:  1/5/2023 - Yoli Burnett is here for follow up for breast cancer. She is doing well and has no breast concerns. She has switched to Arimidex due to headaches and is tolerating it much better. She had a BL DG MG on 12/5 which was benign/stable.    HPI:  Yoli Burnett initially presents on 12/28/21 at age 66 Years with AJCC 8th ed clinical anatomic stage IA / prognostic stage IA (cT1c cN0 M0) Left breast 11 o'clock posterior depth infiltrating mammary carcinoma with mixed ductal and lobular features grade 1 ER 95%, TN 96%, HER2 0 - negative on IHC and Ki 67 - 5%.  She is s/p left breast partial mastectomy and SLNB on 1/7/2021. Surgical pathology revealed grade 1 invasive ductal carcinoma measuring 8 mm. No DCIS identified. Resection margin clear. Other findings include  atypical lobular hyperplasia and lobular carcinoma in situ. Two sentinel lymph nodes negative for metastases.    A detailed patient history was obtained and reviewed.    Imagin. 2021 BL SC MG at [facility] - which revealed in the left breast a focal asymmetry in the left upper inner quadrant middle depth with possible associated architectural distortion and no other significant finding in the right breast. BI-RADS 0: additional imaging needed  2. 2021 Left DG MG / US at [facility] - which revealed on L MG a 1.1 cm irregular mass with spiculated margins in the 11 o'clock posterior depth. On L US at 11 o'clock 5 cm FN a subtle 1.1 cm x 0.8 cm x 0.9 cm irregular, hypoechoic mass with spiculated margins is noted and corresponds to the mammographic finding. BI-RADS 5: highly suspicious and biopsy was recommended.  3. 2022 L DG MG - BIRADS 2: BENIGN: There is new, expected post-therapy change of the left breast and left axilla.  There is scarring, fat necrosis, and surgical clips in the 11:00 axis posterior depth lumpectomy bed.  There are postsurgical changes in the left axilla related to prior surgical lymph node sampling.  Mild diffuse left breast skin thickening is related to prior radiation therapy.    4. 2022 BL DG MG - BIARDS 2: BENIGN: No suspicious masses, calcifications, or other signs of malignancy are identified.  Benign post lumpectomy changes are present in the left breast.  Postsurgical changes are noted in the left axilla at site of prior sentinel lymph node biopsy.  There has been no significant interval change.      Pathology:  1. 2021 Stereotactic-guided core biopsy Left Breast 11 o'clock posterior depth - Invasive carcinoma with mixed ductal and lobular features, grade 1  ER 95%  RI 96%  HER2 Arvin 0 on IHC  Ki 67 5% (low)    2. 2022 L Partial mastectomy and SLNB - grade 1 invasive ductal carcinoma measuring 8 mm. No DCIS identified. Resection margin clear. Other findings include  atypical lobular hyperplasia and lobular carcinoma in situ. Two sentinel lymph nodes negative for metastases.    OB/GYN History:  Menarche Onset: 13  Menopause: Post, at age:  Hormonal birth control (duration): no   Pregnancies: 3  Age at first pregnancy: 27  Child births: 3  Breastfeeding duration: no   Hysterectomy: no  Oophorectomy: no  HRT: no    Other:  # of breast biopsies (when and pathology results): none  MG breast density: Category B (Scattered fibroglandular)  Prior thoracic RT: none  Genetic testing: none  Ashkenazi Roman Catholic descent: No    Family History:  Family History   Problem Relation Age of Onset    Osteoporosis Mother     Alzheimer's disease Mother     Hypertension Mother     Osteoarthritis Mother     Arthritis Mother     Heart disease Father     Hypertension Father     Heart attack Father     Osteoporosis Sister    Denies family history of breast cancer.     Patient History:  Past Medical History:   Diagnosis Date    Breast cancer     H/O partial mastectomy     Hyperlipidemia     Hypertension     Menopause     Seizures        Past Surgical History:   Procedure Laterality Date    BRAIN SURGERY      BREAST SURGERY       SECTION      KNEE SURGERY      SEDATION, FOR RADIATION THERAPY      SINUS SURGERY         Social History     Socioeconomic History    Marital status:    Tobacco Use    Smoking status: Never    Smokeless tobacco: Never   Substance and Sexual Activity    Alcohol use: Not Currently     Comment: Rarely    Drug use: Never    Sexual activity: Yes     Partners: Male     Birth control/protection: None       Immunization History   Administered Date(s) Administered    COVID-19, MRNA, LN-S, PF (Pfizer) (Purple Cap) 2021, 2021       Medications/Allergies:  Current Outpatient Medications on File Prior to Visit   Medication Sig Dispense Refill    anastrozole (ARIMIDEX) 1 mg Tab Take 1 tablet (1 mg total) by mouth once daily. 30 tablet 3    atorvastatin (LIPITOR) 10 MG  tablet Take 10 mg by mouth once daily.      ibuprofen (ADVIL,MOTRIN) 200 MG tablet Take 200 mg by mouth every 6 (six) hours as needed for Pain.      lacosamide (VIMPAT) 100 mg Tab Take 1 tablet (100 mg total) by mouth every 12 (twelve) hours. 60 tablet 5    losartan (COZAAR) 25 MG tablet Take 25 mg by mouth.      [DISCONTINUED] losartan (COZAAR) 100 MG tablet Take 100 mg by mouth once daily.      alendronate (FOSAMAX) 70 MG tablet Take 1 tablet (70 mg total) by mouth every 7 days. 4 tablet 11     No current facility-administered medications on file prior to visit.       Review of patient's allergies indicates:  Not on File    Review of Systems:  Pertinent items are noted in HPI.     Objective:     Vitals:  Vitals:    01/05/23 0904   BP: (!) 151/75   Pulse: 67   Resp: 18   Temp: 97.8 °F (36.6 °C)         Body mass index is 41.55 kg/m².     Physical Exam:  General: The patient is awake, alert and oriented times three. The patient is well nourished and in no acute distress.  Neck: There is no evidence of palpable cervical, supraclavicular or axillary adenopathy. The neck is supple. The thyroid is not enlarged.  Musculoskeletal: The patient has a normal range of motion of her bilateral upper extremities.  Chest: Examination of the chest wall fails to reveal any obvious abnormalities. Nonlabored breathing, symmetric expansion.  Breast:  Right: Examination of right breast fails to reveal any dominant masses or areas of significant focal nodularity. The nipple is everted without evidence of discharge. There is no skin dimpling with movement of the pectoralis. There are no significant skin changes overlying the breast.   Left: Examination of the left breast fails to reveal any dominant masses or areas of significant focal nodularity. The nipple is everted without evidence of discharge. There is no skin dimpling with movement of the pectoralis. There are no significant skin changes overlying the breast.  Abdomen: The abdomen  is soft, flat, nontender and nondistended.  Integumentary: no rashes or skin lesions present  Neurologic: cranial nerves intact, no signs of peripheral neurological deficit, motor/sensory function intact      Assessment and Plan:       Yoli was seen today for follow-up.    Diagnoses and all orders for this visit:    Malignant neoplasm of central portion of left breast in female, estrogen receptor positive  -     Mammo Digital Diagnostic Bilat with Jett; Future    History of partial mastectomy of left breast          Plan:       BL DG MG due Dec 2023.    RTC in 6 months for CBE.    Healthy lifestyle guidelines were reviewed. She was encouraged to engage in regular exercise, maintain a healthy body weight, and avoid excessive alcohol consumption. Healthy nutritional guidelines were also discussed. Self-breast examination was reviewed with the patient in detail and she was encouraged to perform this on a monthly basis.          All of her questions were answered. She was advised to call if she develops any questions or concerns.    Jelly Richardson PA-C                Total time on the date of the visit ranged from 30-39 mins (18406). Total time includes both face-to-face and non-face-to-face time personally spent by myself on the day of the visit.    Non-face-to-face time included:  _X_ preparing to see the patient such as reviewing the patient record  __ obtaining and reviewing separately obtained history  _X_ independently interpreting results  _X_ documenting clinical information in electronic health record.    Face-to-face time included:  _X_ performing an appropriate history and examination  _X_ communicating results to the patient  _X_ counseling and educating the patient  __ ordering appropriate medications  _x_ ordering appropriate tests  _X_ ordering appropriate procedures (including follow-up)  _X_ answering any questions the patient had    Total Time spent on date of visit: 35 minutes

## 2023-01-04 ENCOUNTER — TELEPHONE (OUTPATIENT)
Dept: NEUROLOGY | Facility: CLINIC | Age: 68
End: 2023-01-04
Payer: COMMERCIAL

## 2023-01-04 NOTE — TELEPHONE ENCOUNTER
----- Message from Alicia Tvoar MD sent at 1/3/2023  4:51 PM CST -----  MRI is stable, no presence of recurrent meningioma.

## 2023-01-05 ENCOUNTER — OFFICE VISIT (OUTPATIENT)
Dept: SURGERY | Facility: CLINIC | Age: 68
End: 2023-01-05
Payer: COMMERCIAL

## 2023-01-05 ENCOUNTER — PATIENT MESSAGE (OUTPATIENT)
Dept: SURGERY | Facility: CLINIC | Age: 68
End: 2023-01-05

## 2023-01-05 VITALS
HEART RATE: 67 BPM | WEIGHT: 257.38 LBS | OXYGEN SATURATION: 98 % | SYSTOLIC BLOOD PRESSURE: 151 MMHG | DIASTOLIC BLOOD PRESSURE: 75 MMHG | TEMPERATURE: 98 F | HEIGHT: 66 IN | RESPIRATION RATE: 18 BRPM | BODY MASS INDEX: 41.37 KG/M2

## 2023-01-05 DIAGNOSIS — Z90.12 HISTORY OF PARTIAL MASTECTOMY OF LEFT BREAST: ICD-10-CM

## 2023-01-05 DIAGNOSIS — C50.112 MALIGNANT NEOPLASM OF CENTRAL PORTION OF LEFT BREAST IN FEMALE, ESTROGEN RECEPTOR POSITIVE: Primary | ICD-10-CM

## 2023-01-05 DIAGNOSIS — Z17.0 MALIGNANT NEOPLASM OF CENTRAL PORTION OF LEFT BREAST IN FEMALE, ESTROGEN RECEPTOR POSITIVE: Primary | ICD-10-CM

## 2023-01-05 PROCEDURE — 1159F PR MEDICATION LIST DOCUMENTED IN MEDICAL RECORD: ICD-10-PCS | Mod: CPTII,S$GLB,, | Performed by: PHYSICIAN ASSISTANT

## 2023-01-05 PROCEDURE — 99999 PR PBB SHADOW E&M-EST. PATIENT-LVL IV: ICD-10-PCS | Mod: PBBFAC,,, | Performed by: PHYSICIAN ASSISTANT

## 2023-01-05 PROCEDURE — 1159F MED LIST DOCD IN RCRD: CPT | Mod: CPTII,S$GLB,, | Performed by: PHYSICIAN ASSISTANT

## 2023-01-05 PROCEDURE — 99999 PR PBB SHADOW E&M-EST. PATIENT-LVL IV: CPT | Mod: PBBFAC,,, | Performed by: PHYSICIAN ASSISTANT

## 2023-01-05 PROCEDURE — 99214 PR OFFICE/OUTPT VISIT, EST, LEVL IV, 30-39 MIN: ICD-10-PCS | Mod: S$GLB,,, | Performed by: PHYSICIAN ASSISTANT

## 2023-01-05 PROCEDURE — 3008F BODY MASS INDEX DOCD: CPT | Mod: CPTII,S$GLB,, | Performed by: PHYSICIAN ASSISTANT

## 2023-01-05 PROCEDURE — 3077F PR MOST RECENT SYSTOLIC BLOOD PRESSURE >= 140 MM HG: ICD-10-PCS | Mod: CPTII,S$GLB,, | Performed by: PHYSICIAN ASSISTANT

## 2023-01-05 PROCEDURE — 4010F ACE/ARB THERAPY RXD/TAKEN: CPT | Mod: CPTII,S$GLB,, | Performed by: PHYSICIAN ASSISTANT

## 2023-01-05 PROCEDURE — 3077F SYST BP >= 140 MM HG: CPT | Mod: CPTII,S$GLB,, | Performed by: PHYSICIAN ASSISTANT

## 2023-01-05 PROCEDURE — 4010F PR ACE/ARB THEARPY RXD/TAKEN: ICD-10-PCS | Mod: CPTII,S$GLB,, | Performed by: PHYSICIAN ASSISTANT

## 2023-01-05 PROCEDURE — 3078F DIAST BP <80 MM HG: CPT | Mod: CPTII,S$GLB,, | Performed by: PHYSICIAN ASSISTANT

## 2023-01-05 PROCEDURE — 3008F PR BODY MASS INDEX (BMI) DOCUMENTED: ICD-10-PCS | Mod: CPTII,S$GLB,, | Performed by: PHYSICIAN ASSISTANT

## 2023-01-05 PROCEDURE — 3078F PR MOST RECENT DIASTOLIC BLOOD PRESSURE < 80 MM HG: ICD-10-PCS | Mod: CPTII,S$GLB,, | Performed by: PHYSICIAN ASSISTANT

## 2023-01-05 PROCEDURE — 99214 OFFICE O/P EST MOD 30 MIN: CPT | Mod: S$GLB,,, | Performed by: PHYSICIAN ASSISTANT

## 2023-01-05 RX ORDER — LOSARTAN POTASSIUM 25 MG/1
25 TABLET ORAL DAILY
COMMUNITY
Start: 2022-10-08

## 2023-01-17 ENCOUNTER — TELEPHONE (OUTPATIENT)
Dept: NEUROLOGY | Facility: CLINIC | Age: 68
End: 2023-01-17
Payer: COMMERCIAL

## 2023-01-17 DIAGNOSIS — Z86.69 HISTORY OF COMPLEX PARTIAL EPILEPSY: ICD-10-CM

## 2023-01-17 RX ORDER — LACOSAMIDE 100 MG/1
100 TABLET ORAL EVERY 12 HOURS
Qty: 60 TABLET | Refills: 5 | Status: SHIPPED | OUTPATIENT
Start: 2023-01-17 | End: 2023-04-18 | Stop reason: SDUPTHER

## 2023-01-17 NOTE — TELEPHONE ENCOUNTER
Patient here in office requesting refill for Vimpat 100 mg bid brand name. Informed I did send refill for Dr. Tovar to sign. It is controlled

## 2023-01-17 NOTE — TELEPHONE ENCOUNTER
Patient states generic of Vimpat is not covered under insurance and is over $200. Reports Brand name, however, is covered and requesting rx be sent to pharmacy requesting brand name only.     Phone: [phone]

## 2023-03-23 NOTE — PROGRESS NOTES
Subjective:       Patient ID: Yoli Burnett is a 67 y.o. female.    Surgeon: Dr. Mary Painter  PCP: Dr. Valerie Quispe    Left Breast Cancer Stage IA ([id number])--Diagnosed 21  Biopsy/pathology: Left breast 11:00 mass biopsy done 21--invasive carcinoma with mixed ductal and lobular features, Grade 1, carcinoma present on 5 cores and measures at least 7mm, ER 95%, AL 96%, Her 2 0 by IHC, negative, Ki67 low 5%.   Surgery/pathology: Left breast lumpectomy with SLN biopsy done 22--invasive ductal carcinoma 8mm, Grade 1 with organizing biopsy site changes, no LVI, no DCIS, resection margin clear, proliferative fibrocystic changes with focal atypical lobular hyperplasia and LCIS, 2 SLNs negative.   Imagin. Screening MMG bilateral 21--focal asymmetry with possible architectural distortion in the left beast UIQ middle depth needs further evaluation, BIRADS 0.   2. Left Diagnostic MMG/US done 21--irregular, spiculated mass in 11:00 left breast, posterior depth, highly suggestive of malignancy, measures 11mm, BIRADS 5.     DEXA:  2020--AP spine T= -0.3, Femur neck left -2.1, right -1.8, total femur left -2.1, right -2.2 c/w osteopenia.   2022--AP spine T= 0.8, Femur neck left -2.3, right -1.9, total femur left -2.3, right -1.9, total femur right -2.1 c/w osteopenia, some improved, some worse.    Treatment History:  Adjuvant XRT x 4 weeks, completed on 22.     Current treatment plan:   1. Femara started on 22, changed to Arimidex 10/19/22 for HA.  2. Fosamax weekly started 2022.     Chief Complaint: Other Misc (Pt reports no new concerns today.)    HPI   The patient presents today for scheduled follow-up for her breast cancer. She is doing better after changing to the Armidex as far as for HA. Still has the joint pains in her knees and is planning to have double knee replacement in the summer. She complains of weight gain and hyperglycemia. Scheduled to see her  PCP soon. She is otherwise doing okay. DEXA in December showed mixed response with ongoing osteopenia. She was notified and started on Fosamax weekly. No other problems reported.     Past Medical History:   Diagnosis Date    Breast cancer     H/O partial mastectomy     Hyperlipidemia     Hypertension     Menopause     Seizures       Review of patient's allergies indicates:  No Known Allergies     Current Outpatient Medications:     alendronate (FOSAMAX) 70 MG tablet, Take 1 tablet (70 mg total) by mouth every 7 days., Disp: 4 tablet, Rfl: 11    anastrozole (ARIMIDEX) 1 mg Tab, TAKE 1 TABLET BY MOUTH EVERY DAY, Disp: 90 tablet, Rfl: 0    atorvastatin (LIPITOR) 10 MG tablet, Take 10 mg by mouth once daily., Disp: , Rfl:     ibuprofen (ADVIL,MOTRIN) 200 MG tablet, Take 200 mg by mouth every 6 (six) hours as needed for Pain., Disp: , Rfl:     lacosamide (VIMPAT) 100 mg Tab, Take 1 tablet (100 mg total) by mouth every 12 (twelve) hours., Disp: 60 tablet, Rfl: 5    losartan (COZAAR) 25 MG tablet, Take 25 mg by mouth., Disp: , Rfl:      Review of Systems   Constitutional:  Positive for unexpected weight change (weight gain). Negative for appetite change.   HENT:  Negative for mouth sores.    Eyes:  Negative for visual disturbance.   Respiratory:  Negative for cough and shortness of breath.    Cardiovascular:  Negative for chest pain.   Gastrointestinal:  Negative for abdominal pain and diarrhea.   Genitourinary:  Negative for frequency.   Musculoskeletal:  Negative for back pain.        Bilateral knee pains   Integumentary:  Negative for rash.   Neurological:  Negative for headaches.   Hematological:  Negative for adenopathy.   Psychiatric/Behavioral:  The patient is not nervous/anxious.        Vitals:    03/30/23 0931   BP: (!) 152/78   Pulse: 62   Resp: 14   Temp: 98.3 °F (36.8 °C)     Physical Exam  Vitals reviewed.   Constitutional:       Appearance: Normal appearance. She is overweight.   HENT:      Head:  Normocephalic.   Eyes:      General: Lids are normal. Vision grossly intact.      Extraocular Movements: Extraocular movements intact.      Conjunctiva/sclera: Conjunctivae normal.   Cardiovascular:      Rate and Rhythm: Normal rate and regular rhythm.      Pulses: Normal pulses.      Heart sounds: Normal heart sounds, S1 normal and S2 normal.   Pulmonary:      Effort: Pulmonary effort is normal.      Breath sounds: Normal breath sounds.   Chest:      Comments: Left breast lumpectomy incision and axillary incision intact. No palpable masses or lesions bilaterally  Abdominal:      General: Abdomen is protuberant. Bowel sounds are normal.      Palpations: Abdomen is soft.   Musculoskeletal:      Cervical back: Normal range of motion and neck supple.      Right lower leg: No edema.      Left lower leg: No edema.   Skin:     General: Skin is warm and moist.      Capillary Refill: Capillary refill takes less than 2 seconds.   Neurological:      General: No focal deficit present.      Mental Status: She is alert and oriented to person, place, and time.   Psychiatric:         Attention and Perception: Attention normal.         Mood and Affect: Mood normal.         Speech: Speech normal.         Behavior: Behavior normal. Behavior is cooperative.         Cognition and Memory: Cognition normal.         Judgment: Judgment normal.     ECOG SCORE    0 - Fully active-able to carry on all pre-disease performance without restriction       Lab Visit on 03/24/2023   Component Date Value    Sodium Level 03/24/2023 139     Potassium Level 03/24/2023 5.0     Chloride 03/24/2023 102     Carbon Dioxide 03/24/2023 27     Glucose Level 03/24/2023 140 (H)     Blood Urea Nitrogen 03/24/2023 18.4     Creatinine 03/24/2023 0.85     Calcium Level Total 03/24/2023 10.0     Protein Total 03/24/2023 6.6     Albumin Level 03/24/2023 4.2     Globulin 03/24/2023 2.4     Albumin/Globulin Ratio 03/24/2023 1.8     Bilirubin Total 03/24/2023 0.6      Alkaline Phosphatase 03/24/2023 80     Alanine Aminotransferase 03/24/2023 16     Aspartate Aminotransfera* 03/24/2023 18     eGFR 03/24/2023 >60     WBC 03/24/2023 5.4     RBC 03/24/2023 5.12     Hgb 03/24/2023 13.9     Hct 03/24/2023 46.0     MCV 03/24/2023 89.8     MCH 03/24/2023 27.1     MCHC 03/24/2023 30.2 (L)     RDW 03/24/2023 13.2     Platelet 03/24/2023 206     MPV 03/24/2023 9.8     Neut % 03/24/2023 58.3     Lymph % 03/24/2023 30.5     Mono % 03/24/2023 7.5     Eos % 03/24/2023 2.6     Basophil % 03/24/2023 0.7     Lymph # 03/24/2023 1.63     Neut # 03/24/2023 3.12     Mono # 03/24/2023 0.40     Eos # 03/24/2023 0.14     Baso # 03/24/2023 0.04     IG# 03/24/2023 0.02     IG% 03/24/2023 0.4           Assessment:       1. Malignant neoplasm of central portion of left breast in female, estrogen receptor positive    2. Osteopenia, unspecified location       Plan:     Patient with stage IA left breast cancer s/p lumpectomy done 1/7/22. Pathology showed 8mm IDCA, Grade 1 with clear margins, 2 negative SLNs. Tumor strongly ER and WV positive and Her2 negative.  Per NCCN guidelines, T1bN0 low grade tumors without LVI were not included in [employer] and are treated with endocrine therapy alone due to low risk.  Patient completed adjuvant radiation 4/8/22.  Recommended treatment with AI x 5-10 years.    Currently patient is doing well without any signs or symptoms to suggest disease recurrence.  Recent labs all good.   Started Femara on 4/22/22. Changed to Arimidex in 10/2022 for HA and she is tolerating better.  Repeat DEXA 12/5/22 with ongoing osteopenia. Started Fosamax in December 2022. Plan to repeat DEXA in 12/2025.  Continue Arimidex.  Continue Calcium + Vitamin D3 twice daily.   Continue routine surveillance visits every 3 months.   Bilateral MMG 12/5/22 benign. Plan to repeat in 1 year. Scheduled by her surgeon.    All questions answered at this time.      WHITNEY Cardenas

## 2023-03-24 ENCOUNTER — LAB VISIT (OUTPATIENT)
Dept: LAB | Facility: HOSPITAL | Age: 68
End: 2023-03-24
Attending: INTERNAL MEDICINE
Payer: COMMERCIAL

## 2023-03-24 DIAGNOSIS — C50.112 MALIGNANT NEOPLASM OF CENTRAL PORTION OF LEFT BREAST IN FEMALE, ESTROGEN RECEPTOR POSITIVE: ICD-10-CM

## 2023-03-24 DIAGNOSIS — Z17.0 MALIGNANT NEOPLASM OF CENTRAL PORTION OF LEFT BREAST IN FEMALE, ESTROGEN RECEPTOR POSITIVE: ICD-10-CM

## 2023-03-24 LAB
ALBUMIN SERPL-MCNC: 4.2 G/DL (ref 3.4–4.8)
ALBUMIN/GLOB SERPL: 1.8 RATIO (ref 1.1–2)
ALP SERPL-CCNC: 80 UNIT/L (ref 40–150)
ALT SERPL-CCNC: 16 UNIT/L (ref 0–55)
AST SERPL-CCNC: 18 UNIT/L (ref 5–34)
BASOPHILS # BLD AUTO: 0.04 X10(3)/MCL (ref 0–0.2)
BASOPHILS NFR BLD AUTO: 0.7 %
BILIRUBIN DIRECT+TOT PNL SERPL-MCNC: 0.6 MG/DL
BUN SERPL-MCNC: 18.4 MG/DL (ref 9.8–20.1)
CALCIUM SERPL-MCNC: 10 MG/DL (ref 8.4–10.2)
CHLORIDE SERPL-SCNC: 102 MMOL/L (ref 98–107)
CO2 SERPL-SCNC: 27 MMOL/L (ref 23–31)
CREAT SERPL-MCNC: 0.85 MG/DL (ref 0.55–1.02)
EOSINOPHIL # BLD AUTO: 0.14 X10(3)/MCL (ref 0–0.9)
EOSINOPHIL NFR BLD AUTO: 2.6 %
ERYTHROCYTE [DISTWIDTH] IN BLOOD BY AUTOMATED COUNT: 13.2 % (ref 11.5–17)
GFR SERPLBLD CREATININE-BSD FMLA CKD-EPI: >60 MLS/MIN/1.73/M2
GLOBULIN SER-MCNC: 2.4 GM/DL (ref 2.4–3.5)
GLUCOSE SERPL-MCNC: 140 MG/DL (ref 82–115)
HCT VFR BLD AUTO: 46 % (ref 37–47)
HGB BLD-MCNC: 13.9 G/DL (ref 12–16)
IMM GRANULOCYTES # BLD AUTO: 0.02 X10(3)/MCL (ref 0–0.04)
IMM GRANULOCYTES NFR BLD AUTO: 0.4 %
LYMPHOCYTES # BLD AUTO: 1.63 X10(3)/MCL (ref 0.6–4.6)
LYMPHOCYTES NFR BLD AUTO: 30.5 %
MCH RBC QN AUTO: 27.1 PG (ref 27–31)
MCHC RBC AUTO-ENTMCNC: 30.2 G/DL (ref 33–36)
MCV RBC AUTO: 89.8 FL (ref 80–94)
MONOCYTES # BLD AUTO: 0.4 X10(3)/MCL (ref 0.1–1.3)
MONOCYTES NFR BLD AUTO: 7.5 %
NEUTROPHILS # BLD AUTO: 3.12 X10(3)/MCL (ref 2.1–9.2)
NEUTROPHILS NFR BLD AUTO: 58.3 %
PLATELET # BLD AUTO: 206 X10(3)/MCL (ref 130–400)
PMV BLD AUTO: 9.8 FL (ref 7.4–10.4)
POTASSIUM SERPL-SCNC: 5 MMOL/L (ref 3.5–5.1)
PROT SERPL-MCNC: 6.6 GM/DL (ref 5.8–7.6)
RBC # BLD AUTO: 5.12 X10(6)/MCL (ref 4.2–5.4)
SODIUM SERPL-SCNC: 139 MMOL/L (ref 136–145)
WBC # SPEC AUTO: 5.4 X10(3)/MCL (ref 4.5–11.5)

## 2023-03-24 PROCEDURE — 80053 COMPREHEN METABOLIC PANEL: CPT

## 2023-03-24 PROCEDURE — 36415 COLL VENOUS BLD VENIPUNCTURE: CPT

## 2023-03-24 PROCEDURE — 85025 COMPLETE CBC W/AUTO DIFF WBC: CPT

## 2023-03-30 ENCOUNTER — OFFICE VISIT (OUTPATIENT)
Dept: HEMATOLOGY/ONCOLOGY | Facility: CLINIC | Age: 68
End: 2023-03-30
Payer: COMMERCIAL

## 2023-03-30 VITALS
RESPIRATION RATE: 14 BRPM | HEART RATE: 62 BPM | BODY MASS INDEX: 37.6 KG/M2 | TEMPERATURE: 98 F | HEIGHT: 70 IN | SYSTOLIC BLOOD PRESSURE: 152 MMHG | OXYGEN SATURATION: 99 % | WEIGHT: 262.63 LBS | DIASTOLIC BLOOD PRESSURE: 78 MMHG

## 2023-03-30 DIAGNOSIS — Z17.0 MALIGNANT NEOPLASM OF CENTRAL PORTION OF LEFT BREAST IN FEMALE, ESTROGEN RECEPTOR POSITIVE: Primary | ICD-10-CM

## 2023-03-30 DIAGNOSIS — C50.112 MALIGNANT NEOPLASM OF CENTRAL PORTION OF LEFT BREAST IN FEMALE, ESTROGEN RECEPTOR POSITIVE: Primary | ICD-10-CM

## 2023-03-30 DIAGNOSIS — M85.80 OSTEOPENIA, UNSPECIFIED LOCATION: ICD-10-CM

## 2023-03-30 PROCEDURE — 3008F PR BODY MASS INDEX (BMI) DOCUMENTED: ICD-10-PCS | Mod: CPTII,S$GLB,, | Performed by: NURSE PRACTITIONER

## 2023-03-30 PROCEDURE — 1160F RVW MEDS BY RX/DR IN RCRD: CPT | Mod: CPTII,S$GLB,, | Performed by: NURSE PRACTITIONER

## 2023-03-30 PROCEDURE — 1159F MED LIST DOCD IN RCRD: CPT | Mod: CPTII,S$GLB,, | Performed by: NURSE PRACTITIONER

## 2023-03-30 PROCEDURE — 3078F DIAST BP <80 MM HG: CPT | Mod: CPTII,S$GLB,, | Performed by: NURSE PRACTITIONER

## 2023-03-30 PROCEDURE — 1126F AMNT PAIN NOTED NONE PRSNT: CPT | Mod: CPTII,S$GLB,, | Performed by: NURSE PRACTITIONER

## 2023-03-30 PROCEDURE — 3077F PR MOST RECENT SYSTOLIC BLOOD PRESSURE >= 140 MM HG: ICD-10-PCS | Mod: CPTII,S$GLB,, | Performed by: NURSE PRACTITIONER

## 2023-03-30 PROCEDURE — 3008F BODY MASS INDEX DOCD: CPT | Mod: CPTII,S$GLB,, | Performed by: NURSE PRACTITIONER

## 2023-03-30 PROCEDURE — 4010F PR ACE/ARB THEARPY RXD/TAKEN: ICD-10-PCS | Mod: CPTII,S$GLB,, | Performed by: NURSE PRACTITIONER

## 2023-03-30 PROCEDURE — 1159F PR MEDICATION LIST DOCUMENTED IN MEDICAL RECORD: ICD-10-PCS | Mod: CPTII,S$GLB,, | Performed by: NURSE PRACTITIONER

## 2023-03-30 PROCEDURE — 99213 PR OFFICE/OUTPT VISIT, EST, LEVL III, 20-29 MIN: ICD-10-PCS | Mod: S$GLB,,, | Performed by: NURSE PRACTITIONER

## 2023-03-30 PROCEDURE — 4010F ACE/ARB THERAPY RXD/TAKEN: CPT | Mod: CPTII,S$GLB,, | Performed by: NURSE PRACTITIONER

## 2023-03-30 PROCEDURE — 1160F PR REVIEW ALL MEDS BY PRESCRIBER/CLIN PHARMACIST DOCUMENTED: ICD-10-PCS | Mod: CPTII,S$GLB,, | Performed by: NURSE PRACTITIONER

## 2023-03-30 PROCEDURE — 3077F SYST BP >= 140 MM HG: CPT | Mod: CPTII,S$GLB,, | Performed by: NURSE PRACTITIONER

## 2023-03-30 PROCEDURE — 99213 OFFICE O/P EST LOW 20 MIN: CPT | Mod: S$GLB,,, | Performed by: NURSE PRACTITIONER

## 2023-03-30 PROCEDURE — 1126F PR PAIN SEVERITY QUANTIFIED, NO PAIN PRESENT: ICD-10-PCS | Mod: CPTII,S$GLB,, | Performed by: NURSE PRACTITIONER

## 2023-03-30 PROCEDURE — 3078F PR MOST RECENT DIASTOLIC BLOOD PRESSURE < 80 MM HG: ICD-10-PCS | Mod: CPTII,S$GLB,, | Performed by: NURSE PRACTITIONER

## 2023-03-30 PROCEDURE — 99999 PR PBB SHADOW E&M-EST. PATIENT-LVL IV: CPT | Mod: PBBFAC,,, | Performed by: NURSE PRACTITIONER

## 2023-03-30 PROCEDURE — 99999 PR PBB SHADOW E&M-EST. PATIENT-LVL IV: ICD-10-PCS | Mod: PBBFAC,,, | Performed by: NURSE PRACTITIONER

## 2023-04-18 ENCOUNTER — TELEPHONE (OUTPATIENT)
Dept: NEUROLOGY | Facility: CLINIC | Age: 68
End: 2023-04-18
Payer: COMMERCIAL

## 2023-04-18 DIAGNOSIS — Z86.69 HISTORY OF COMPLEX PARTIAL EPILEPSY: ICD-10-CM

## 2023-04-18 RX ORDER — LACOSAMIDE 100 MG/1
100 TABLET ORAL EVERY 12 HOURS
Qty: 60 TABLET | Refills: 5 | Status: SHIPPED | OUTPATIENT
Start: 2023-04-18 | End: 2023-10-12 | Stop reason: SDUPTHER

## 2023-04-18 NOTE — TELEPHONE ENCOUNTER
Pt states new prescription is needed for this medication.   Reports she is almost out of medication.   States this is due to a insurance change in January of 2023.     LOV: 12/15/22    NOV:12/14/23

## 2023-06-28 NOTE — PROGRESS NOTES
[facility] - [facility]  Breast Surgical Oncology  Follow-Up Patient Office Visit       Referring Provider: No ref. provider found   PCP: Leslie Quispe MD   Care Team:     Chief Complaint:   Chief Complaint   Patient presents with    Follow-up     Patient is present for a 6 month follow up visit for CBE.       Subjective:      Cancer Staging   Malignant neoplasm of central portion of left breast in female, estrogen receptor positive  Staging form: Breast, AJCC 8th Edition  - Pathologic stage from 2021: Stage IA (pT1b, pN0(sn), cM0, G1, ER+, OK+, HER2-) - Signed by JL Darnell on 2022    Treatment History:  1. S/P left breast partial mastectomy and SLNB 2022  2. Adjuvant XRT x 4 weeks, completed on 22.   3. AI expected X 5-10 years. Femara started on 22. Changed to Arimidex 10/19/22 for HA    Interval History:  2023 - Yoli Burnett is here for follow up for breast cancer. She is doing well and has no breast concerns. She is here today for clinical breast exam. No new breast imaging to review.    HPI:  Yoli Burnett initially presents on 21 at age 66 Years with AJCC 8th ed clinical anatomic stage IA / prognostic stage IA (cT1c cN0 M0) Left breast 11 o'clock posterior depth infiltrating mammary carcinoma with mixed ductal and lobular features grade 1 ER 95%, OK 96%, HER2 0 - negative on IHC and Ki 67 - 5%.  She is s/p left breast partial mastectomy and SLNB on 2021. Surgical pathology revealed grade 1 invasive ductal carcinoma measuring 8 mm. No DCIS identified. Resection margin clear. Other findings include atypical lobular hyperplasia and lobular carcinoma in situ. Two sentinel lymph nodes negative for metastases.    A detailed patient history was obtained and reviewed.    Imagin. 2021 BL SC MG at [facility] - which revealed in the left breast a focal asymmetry in the left upper inner quadrant middle depth with possible  associated architectural distortion and no other significant finding in the right breast. BI-RADS 0: additional imaging needed  2. 12/7/2021 Left DG MG / US at [facility] - which revealed on L MG a 1.1 cm irregular mass with spiculated margins in the 11 o'clock posterior depth. On L US at 11 o'clock 5 cm FN a subtle 1.1 cm x 0.8 cm x 0.9 cm irregular, hypoechoic mass with spiculated margins is noted and corresponds to the mammographic finding. BI-RADS 5: highly suspicious and biopsy was recommended.  3. 7/5/2022 L DG MG - BIRADS 2: BENIGN: There is new, expected post-therapy change of the left breast and left axilla.  There is scarring, fat necrosis, and surgical clips in the 11:00 axis posterior depth lumpectomy bed.  There are postsurgical changes in the left axilla related to prior surgical lymph node sampling.  Mild diffuse left breast skin thickening is related to prior radiation therapy.    4. 12/5/2022 BL DG MG - BIARDS 2: BENIGN: No suspicious masses, calcifications, or other signs of malignancy are identified.  Benign post lumpectomy changes are present in the left breast.  Postsurgical changes are noted in the left axilla at site of prior sentinel lymph node biopsy.  There has been no significant interval change.      Pathology:  1. 12/9/2021 Stereotactic-guided core biopsy Left Breast 11 o'clock posterior depth - Invasive carcinoma with mixed ductal and lobular features, grade 1  ER 95%  GA 96%  HER2 Arvin 0 on IHC  Ki 67 5% (low)    2. 1/7/2022 L Partial mastectomy and SLNB - grade 1 invasive ductal carcinoma measuring 8 mm. No DCIS identified. Resection margin clear. Other findings include atypical lobular hyperplasia and lobular carcinoma in situ. Two sentinel lymph nodes negative for metastases.    OB/GYN History:  Menarche Onset: 13  Menopause: Post, at age:  Hormonal birth control (duration): no   Pregnancies: 3  Age at first pregnancy: 27  Child births: 3  Breastfeeding duration: no   Hysterectomy:  no  Oophorectomy: no  HRT: no    Other:  # of breast biopsies (when and pathology results): none  MG breast density: Category B (Scattered fibroglandular)  Prior thoracic RT: none  Genetic testing: none  Ashkenazi Synagogue descent: No    Family History:  Family History   Problem Relation Age of Onset    Osteoporosis Mother     Alzheimer's disease Mother     Hypertension Mother     Osteoarthritis Mother     Arthritis Mother     Heart disease Father     Hypertension Father     Heart attack Father     Osteoporosis Sister    Denies family history of breast cancer.     Patient History:  Past Medical History:   Diagnosis Date    Breast cancer     H/O partial mastectomy     Hyperlipidemia     Hypertension     Menopause     Seizures        Past Surgical History:   Procedure Laterality Date    BRAIN SURGERY      BREAST SURGERY       SECTION      KNEE SURGERY      SEDATION, FOR RADIATION THERAPY      SINUS SURGERY         Social History     Socioeconomic History    Marital status:    Tobacco Use    Smoking status: Never    Smokeless tobacco: Never   Substance and Sexual Activity    Alcohol use: Not Currently     Comment: Rarely    Drug use: Never    Sexual activity: Yes     Partners: Male     Birth control/protection: None       Immunization History   Administered Date(s) Administered    COVID-19, MRNA, LN-S, PF (Pfizer) (Purple Cap) 2021, 2021       Medications/Allergies:  Current Outpatient Medications on File Prior to Visit   Medication Sig Dispense Refill    alendronate (FOSAMAX) 70 MG tablet Take 1 tablet (70 mg total) by mouth every 7 days. 4 tablet 11    anastrozole (ARIMIDEX) 1 mg Tab TAKE 1 TABLET BY MOUTH EVERY DAY 90 tablet 0    atorvastatin (LIPITOR) 20 MG tablet Take 20 mg by mouth.      cholecalciferol, vitamin D3, (VITAMIN D3) 50 mcg (2,000 unit) Cap capsule Take by mouth once daily.      lacosamide (VIMPAT) 100 mg Tab Take 1 tablet (100 mg total) by mouth every 12 (twelve) hours. 60  tablet 5    losartan (COZAAR) 25 MG tablet Take 25 mg by mouth.      meloxicam (MOBIC) 7.5 MG tablet Take 7.5 mg by mouth.      [DISCONTINUED] atorvastatin (LIPITOR) 10 MG tablet Take 10 mg by mouth once daily.      [DISCONTINUED] ibuprofen (ADVIL,MOTRIN) 200 MG tablet Take 200 mg by mouth every 6 (six) hours as needed for Pain.       No current facility-administered medications on file prior to visit.       Review of patient's allergies indicates:  No Known Allergies    Review of Systems:  Pertinent items are noted in HPI.     Objective:     Vitals:  Vitals:    07/05/23 0803   BP: 135/72   Pulse: (!) 58   Resp: 18   Temp: 98.3 °F (36.8 °C)           Body mass index is 36.03 kg/m².     Physical Exam:  General: The patient is awake, alert and oriented times three. The patient is well nourished and in no acute distress.  Neck: There is no evidence of palpable cervical, supraclavicular or axillary adenopathy. The neck is supple. The thyroid is not enlarged.  Musculoskeletal: The patient has a normal range of motion of her bilateral upper extremities.  Chest: Examination of the chest wall fails to reveal any obvious abnormalities. Nonlabored breathing, symmetric expansion.  Breast:  Right: Examination of right breast fails to reveal any dominant masses or areas of significant focal nodularity. The nipple is everted without evidence of discharge. There is no skin dimpling with movement of the pectoralis. There are no significant skin changes overlying the breast.   Left: Examination of the left breast fails to reveal any dominant masses or areas of significant focal nodularity. The nipple is everted without evidence of discharge. There is no skin dimpling with movement of the pectoralis. There are no significant skin changes overlying the breast.  Abdomen: The abdomen is soft, flat, nontender and nondistended.  Integumentary: no rashes or skin lesions present  Neurologic: cranial nerves intact, no signs of peripheral  neurological deficit, motor/sensory function intact      Assessment and Plan:       There are no diagnoses linked to this encounter.        Plan:       BL DG MG due Dec 2023.    RTC in 6 months for CBE.    Continue AI and follow up with Oncology.    Healthy lifestyle guidelines were reviewed. She was encouraged to engage in regular exercise, maintain a healthy body weight, and avoid excessive alcohol consumption. Healthy nutritional guidelines were also discussed. Self-breast examination was reviewed with the patient in detail and she was encouraged to perform this on a monthly basis.          All of her questions were answered. She was advised to call if she develops any questions or concerns.    Jelly Richardson PA-C                --------------------------------------------------------------------------------------------------------------  Total time on the date of the visit ranged from 20-29 mins ([id number]). Total time includes both face-to-face and non-face-to-face time personally spent by myself on the day of the visit.    Non-face-to-face time included:  _X_ preparing to see the patient such as reviewing the patient record  __ obtaining and reviewing separately obtained history  _X_ independently interpreting results  _X_ documenting clinical information in electronic health record.    Face-to-face time included:  _X_ performing an appropriate history and examination  _X_ communicating results to the patient  _X_ counseling and educating the patient  __ ordering appropriate medications  _x_ ordering appropriate tests  _X_ ordering appropriate procedures (including follow-up)  _X_ answering any questions the patient had    Total Time spent on date of visit: 25 minutes

## 2023-07-05 ENCOUNTER — OFFICE VISIT (OUTPATIENT)
Dept: SURGERY | Facility: CLINIC | Age: 68
End: 2023-07-05
Payer: COMMERCIAL

## 2023-07-05 VITALS
SYSTOLIC BLOOD PRESSURE: 135 MMHG | BODY MASS INDEX: 36.14 KG/M2 | TEMPERATURE: 98 F | HEART RATE: 58 BPM | HEIGHT: 69 IN | WEIGHT: 244 LBS | OXYGEN SATURATION: 97 % | DIASTOLIC BLOOD PRESSURE: 72 MMHG | RESPIRATION RATE: 18 BRPM

## 2023-07-05 DIAGNOSIS — Z90.12 HISTORY OF PARTIAL MASTECTOMY OF LEFT BREAST: ICD-10-CM

## 2023-07-05 DIAGNOSIS — Z17.0 MALIGNANT NEOPLASM OF CENTRAL PORTION OF RIGHT BREAST IN FEMALE, ESTROGEN RECEPTOR POSITIVE: ICD-10-CM

## 2023-07-05 DIAGNOSIS — Z17.0 MALIGNANT NEOPLASM OF CENTRAL PORTION OF LEFT BREAST IN FEMALE, ESTROGEN RECEPTOR POSITIVE: Primary | ICD-10-CM

## 2023-07-05 DIAGNOSIS — C50.112 MALIGNANT NEOPLASM OF CENTRAL PORTION OF LEFT BREAST IN FEMALE, ESTROGEN RECEPTOR POSITIVE: Primary | ICD-10-CM

## 2023-07-05 DIAGNOSIS — C50.111 MALIGNANT NEOPLASM OF CENTRAL PORTION OF RIGHT BREAST IN FEMALE, ESTROGEN RECEPTOR POSITIVE: ICD-10-CM

## 2023-07-05 PROCEDURE — 1160F PR REVIEW ALL MEDS BY PRESCRIBER/CLIN PHARMACIST DOCUMENTED: ICD-10-PCS | Mod: CPTII,S$GLB,, | Performed by: PHYSICIAN ASSISTANT

## 2023-07-05 PROCEDURE — 99999 PR PBB SHADOW E&M-EST. PATIENT-LVL IV: ICD-10-PCS | Mod: PBBFAC,,, | Performed by: PHYSICIAN ASSISTANT

## 2023-07-05 PROCEDURE — 4010F PR ACE/ARB THEARPY RXD/TAKEN: ICD-10-PCS | Mod: CPTII,S$GLB,, | Performed by: PHYSICIAN ASSISTANT

## 2023-07-05 PROCEDURE — 1126F AMNT PAIN NOTED NONE PRSNT: CPT | Mod: CPTII,S$GLB,, | Performed by: PHYSICIAN ASSISTANT

## 2023-07-05 PROCEDURE — 1160F RVW MEDS BY RX/DR IN RCRD: CPT | Mod: CPTII,S$GLB,, | Performed by: PHYSICIAN ASSISTANT

## 2023-07-05 PROCEDURE — 3078F DIAST BP <80 MM HG: CPT | Mod: CPTII,S$GLB,, | Performed by: PHYSICIAN ASSISTANT

## 2023-07-05 PROCEDURE — 4010F ACE/ARB THERAPY RXD/TAKEN: CPT | Mod: CPTII,S$GLB,, | Performed by: PHYSICIAN ASSISTANT

## 2023-07-05 PROCEDURE — 3008F PR BODY MASS INDEX (BMI) DOCUMENTED: ICD-10-PCS | Mod: CPTII,S$GLB,, | Performed by: PHYSICIAN ASSISTANT

## 2023-07-05 PROCEDURE — 3075F PR MOST RECENT SYSTOLIC BLOOD PRESS GE 130-139MM HG: ICD-10-PCS | Mod: CPTII,S$GLB,, | Performed by: PHYSICIAN ASSISTANT

## 2023-07-05 PROCEDURE — 3008F BODY MASS INDEX DOCD: CPT | Mod: CPTII,S$GLB,, | Performed by: PHYSICIAN ASSISTANT

## 2023-07-05 PROCEDURE — 99213 OFFICE O/P EST LOW 20 MIN: CPT | Mod: S$GLB,,, | Performed by: PHYSICIAN ASSISTANT

## 2023-07-05 PROCEDURE — 1159F MED LIST DOCD IN RCRD: CPT | Mod: CPTII,S$GLB,, | Performed by: PHYSICIAN ASSISTANT

## 2023-07-05 PROCEDURE — 1159F PR MEDICATION LIST DOCUMENTED IN MEDICAL RECORD: ICD-10-PCS | Mod: CPTII,S$GLB,, | Performed by: PHYSICIAN ASSISTANT

## 2023-07-05 PROCEDURE — 3078F PR MOST RECENT DIASTOLIC BLOOD PRESSURE < 80 MM HG: ICD-10-PCS | Mod: CPTII,S$GLB,, | Performed by: PHYSICIAN ASSISTANT

## 2023-07-05 PROCEDURE — 3075F SYST BP GE 130 - 139MM HG: CPT | Mod: CPTII,S$GLB,, | Performed by: PHYSICIAN ASSISTANT

## 2023-07-05 PROCEDURE — 99999 PR PBB SHADOW E&M-EST. PATIENT-LVL IV: CPT | Mod: PBBFAC,,, | Performed by: PHYSICIAN ASSISTANT

## 2023-07-05 PROCEDURE — 1126F PR PAIN SEVERITY QUANTIFIED, NO PAIN PRESENT: ICD-10-PCS | Mod: CPTII,S$GLB,, | Performed by: PHYSICIAN ASSISTANT

## 2023-07-05 PROCEDURE — 99213 PR OFFICE/OUTPT VISIT, EST, LEVL III, 20-29 MIN: ICD-10-PCS | Mod: S$GLB,,, | Performed by: PHYSICIAN ASSISTANT

## 2023-07-05 RX ORDER — MELOXICAM 7.5 MG/1
7.5 TABLET ORAL
COMMUNITY
Start: 2023-07-02 | End: 2023-11-02

## 2023-07-05 RX ORDER — ACETAMINOPHEN 500 MG
TABLET ORAL DAILY
COMMUNITY

## 2023-07-05 RX ORDER — ATORVASTATIN CALCIUM 20 MG/1
20 TABLET, FILM COATED ORAL DAILY
COMMUNITY
Start: 2023-06-30

## 2023-07-06 NOTE — PROGRESS NOTES
Subjective:       Patient ID: Yoli Burnett is a 68 y.o. female.    Surgeon: Dr. Mary Painter  PCP: Dr. Valerie Quispe    Left Breast Cancer Stage IA ([id number])--Diagnosed 21  Biopsy/pathology: Left breast 11:00 mass biopsy done 21--invasive carcinoma with mixed ductal and lobular features, Grade 1, carcinoma present on 5 cores and measures at least 7mm, ER 95%, NM 96%, Her 2 0 by IHC, negative, Ki67 low 5%.   Surgery/pathology: Left breast lumpectomy with SLN biopsy done 22--invasive ductal carcinoma 8mm, Grade 1 with organizing biopsy site changes, no LVI, no DCIS, resection margin clear, proliferative fibrocystic changes with focal atypical lobular hyperplasia and LCIS, 2 SLNs negative.   Imagin. Screening MMG bilateral 21--focal asymmetry with possible architectural distortion in the left beast UIQ middle depth needs further evaluation, BIRADS 0.   2. Left Diagnostic MMG/US done 21--irregular, spiculated mass in 11:00 left breast, posterior depth, highly suggestive of malignancy, measures 11mm, BIRADS 5.     DEXA:  20--AP spine T= -0.3, Femur neck left -2.1, right -1.8, total femur left -2.1, right -2.2 c/w osteopenia.   22--AP spine T= 0.8, Femur neck left -2.3, right -1.9, total femur left -2.3, right -1.9, total femur right -2.1 c/w osteopenia, some improved, some worse.    Treatment History:  Adjuvant XRT x 4 weeks, completed on 22.     Current treatment plan:   1. Femara started on 22, changed to Arimidex 10/19/22 for HA.  2. Fosamax weekly started 2022.     Chief Complaint: Other Misc (Pt reports no new concerns today.)    HPI   The patient presents today for scheduled follow-up for her breast cancer. She is doing better after changing to the Armidex as far as for HA. Still has the joint pains in her knees and is planning to have double knee replacement next summer. She is otherwise doing okay. Denies any new pain, headaches or changes in her  bowels. No other problems reported.     Past Medical History:   Diagnosis Date    Breast cancer     H/O partial mastectomy     Hyperlipidemia     Hypertension     Menopause     Seizures       Review of patient's allergies indicates:  No Known Allergies     Current Outpatient Medications:     alendronate (FOSAMAX) 70 MG tablet, Take 1 tablet (70 mg total) by mouth every 7 days., Disp: 4 tablet, Rfl: 11    anastrozole (ARIMIDEX) 1 mg Tab, TAKE 1 TABLET BY MOUTH EVERY DAY, Disp: 90 tablet, Rfl: 0    atorvastatin (LIPITOR) 20 MG tablet, Take 20 mg by mouth., Disp: , Rfl:     cholecalciferol, vitamin D3, (VITAMIN D3) 50 mcg (2,000 unit) Cap capsule, Take by mouth once daily., Disp: , Rfl:     clindamycin (CLEOCIN) 300 MG capsule, TAKE TWO CAPSULES NOW, THE ONE CAPSULE BY MOUTH THREE TIMES A DAY UNTIL DONE, Disp: , Rfl:     lacosamide (VIMPAT) 100 mg Tab, Take 1 tablet (100 mg total) by mouth every 12 (twelve) hours., Disp: 60 tablet, Rfl: 5    losartan (COZAAR) 25 MG tablet, Take 25 mg by mouth., Disp: , Rfl:     meloxicam (MOBIC) 7.5 MG tablet, Take 7.5 mg by mouth., Disp: , Rfl:      Review of Systems   Constitutional:  Negative for appetite change and unexpected weight change.   HENT:  Negative for mouth sores.    Eyes:  Negative for visual disturbance.   Respiratory:  Negative for cough and shortness of breath.    Cardiovascular:  Negative for chest pain.   Gastrointestinal:  Negative for abdominal pain and diarrhea.   Genitourinary:  Negative for frequency.   Musculoskeletal:  Positive for leg pain. Negative for back pain.   Integumentary:  Negative for rash.   Neurological:  Negative for headaches.   Hematological:  Negative for adenopathy.   Psychiatric/Behavioral:  The patient is not nervous/anxious.        Vitals:    07/20/23 1334   BP: (!) 145/81   Pulse: 69   Resp: 14   Temp: 97.9 °F (36.6 °C)     Physical Exam  Vitals reviewed.   Constitutional:       Appearance: Normal appearance. She is overweight.   HENT:       Head: Normocephalic.   Eyes:      General: Lids are normal. Vision grossly intact.      Extraocular Movements: Extraocular movements intact.      Conjunctiva/sclera: Conjunctivae normal.   Cardiovascular:      Rate and Rhythm: Normal rate and regular rhythm.      Pulses: Normal pulses.      Heart sounds: Normal heart sounds, S1 normal and S2 normal.   Pulmonary:      Effort: Pulmonary effort is normal.      Breath sounds: Normal breath sounds.   Chest:      Comments: Left breast lumpectomy incision and axillary incision intact. No palpable masses or lesions bilaterally  Abdominal:      General: Abdomen is protuberant. Bowel sounds are normal.      Palpations: Abdomen is soft.   Musculoskeletal:      Cervical back: Normal range of motion and neck supple.      Right lower leg: No edema.      Left lower leg: No edema.   Skin:     General: Skin is warm and moist.      Capillary Refill: Capillary refill takes less than 2 seconds.   Neurological:      General: No focal deficit present.      Mental Status: She is alert and oriented to person, place, and time.   Psychiatric:         Attention and Perception: Attention normal.         Mood and Affect: Mood normal.         Speech: Speech normal.         Behavior: Behavior normal. Behavior is cooperative.         Cognition and Memory: Cognition normal.         Judgment: Judgment normal.     ECOG SCORE    1 - Restricted in strenuous activity-ambulatory and able to carry out work of a light nature       Lab Visit on 07/14/2023   Component Date Value    Sodium Level 07/14/2023 141     Potassium Level 07/14/2023 4.5     Chloride 07/14/2023 107     Carbon Dioxide 07/14/2023 24     Glucose Level 07/14/2023 95     Blood Urea Nitrogen 07/14/2023 21.0 (H)     Creatinine 07/14/2023 0.77     Calcium Level Total 07/14/2023 9.2     Protein Total 07/14/2023 6.2     Albumin Level 07/14/2023 4.2     Globulin 07/14/2023 2.0 (L)     Albumin/Globulin Ratio 07/14/2023 2.1 (H)     Bilirubin  Total 07/14/2023 0.9     Alkaline Phosphatase 07/14/2023 76     Alanine Aminotransferase 07/14/2023 13     Aspartate Aminotransfera* 07/14/2023 14     eGFR 07/14/2023 >60     WBC 07/14/2023 6.44     RBC 07/14/2023 4.78     Hgb 07/14/2023 12.9     Hct 07/14/2023 42.7     MCV 07/14/2023 89.3     MCH 07/14/2023 27.0     MCHC 07/14/2023 30.2 (L)     RDW 07/14/2023 14.7     Platelet 07/14/2023 215     MPV 07/14/2023 9.6     Neut % 07/14/2023 51.1     Lymph % 07/14/2023 35.4     Mono % 07/14/2023 9.6     Eos % 07/14/2023 3.1     Basophil % 07/14/2023 0.5     Lymph # 07/14/2023 2.28     Neut # 07/14/2023 3.29     Mono # 07/14/2023 0.62     Eos # 07/14/2023 0.20     Baso # 07/14/2023 0.03     IG# 07/14/2023 0.02     IG% 07/14/2023 0.3           Assessment:       1. Malignant neoplasm of central portion of left breast in female, estrogen receptor positive    2. Osteopenia, unspecified location       Plan:     Patient with stage IA left breast cancer s/p lumpectomy done 1/7/22. Pathology showed 8mm IDCA, Grade 1 with clear margins, 2 negative SLNs. Tumor strongly ER and UT positive and Her2 negative.  Per NCCN guidelines, T1bN0 low grade tumors without LVI were not included in [employer] and are treated with endocrine therapy alone due to low risk.  Patient completed adjuvant radiation 4/8/22.  Recommended treatment with AI x 5-10 years.    Currently patient is doing well without any signs or symptoms to suggest disease recurrence.  Recent labs all good.   Started Femara on 4/22/22. Changed to Arimidex in 10/2022 for HA and she is tolerating better.  Repeat DEXA 12/5/22 with ongoing osteopenia. Started Fosamax in December 2022. Plan to repeat DEXA in 12/2024.  Continue Arimidex.  Continue Calcium + Vitamin D3 twice daily.   Continue routine surveillance visits every 3 months.   Bilateral MMG 12/5/22 benign. Plan to repeat in 1 year. Scheduled by her surgeon.    All questions answered at this time.      WHITNEY Cardenas

## 2023-07-07 DIAGNOSIS — Z17.0 MALIGNANT NEOPLASM OF CENTRAL PORTION OF RIGHT BREAST IN FEMALE, ESTROGEN RECEPTOR POSITIVE: ICD-10-CM

## 2023-07-07 DIAGNOSIS — C50.111 MALIGNANT NEOPLASM OF CENTRAL PORTION OF RIGHT BREAST IN FEMALE, ESTROGEN RECEPTOR POSITIVE: ICD-10-CM

## 2023-07-07 RX ORDER — ANASTROZOLE 1 MG/1
TABLET ORAL
Qty: 90 TABLET | Refills: 0 | Status: SHIPPED | OUTPATIENT
Start: 2023-07-07 | End: 2023-10-02

## 2023-07-14 ENCOUNTER — LAB VISIT (OUTPATIENT)
Dept: LAB | Facility: HOSPITAL | Age: 68
End: 2023-07-14
Attending: INTERNAL MEDICINE
Payer: COMMERCIAL

## 2023-07-14 DIAGNOSIS — Z17.0 MALIGNANT NEOPLASM OF CENTRAL PORTION OF LEFT BREAST IN FEMALE, ESTROGEN RECEPTOR POSITIVE: ICD-10-CM

## 2023-07-14 DIAGNOSIS — M85.80 OSTEOPENIA, UNSPECIFIED LOCATION: ICD-10-CM

## 2023-07-14 DIAGNOSIS — C50.112 MALIGNANT NEOPLASM OF CENTRAL PORTION OF LEFT BREAST IN FEMALE, ESTROGEN RECEPTOR POSITIVE: ICD-10-CM

## 2023-07-14 LAB
ALBUMIN SERPL-MCNC: 4.2 G/DL (ref 3.4–4.8)
ALBUMIN/GLOB SERPL: 2.1 RATIO (ref 1.1–2)
ALP SERPL-CCNC: 76 UNIT/L (ref 40–150)
ALT SERPL-CCNC: 13 UNIT/L (ref 0–55)
AST SERPL-CCNC: 14 UNIT/L (ref 5–34)
BASOPHILS # BLD AUTO: 0.03 X10(3)/MCL
BASOPHILS NFR BLD AUTO: 0.5 %
BILIRUBIN DIRECT+TOT PNL SERPL-MCNC: 0.9 MG/DL
BUN SERPL-MCNC: 21 MG/DL (ref 9.8–20.1)
CALCIUM SERPL-MCNC: 9.2 MG/DL (ref 8.4–10.2)
CHLORIDE SERPL-SCNC: 107 MMOL/L (ref 98–107)
CO2 SERPL-SCNC: 24 MMOL/L (ref 23–31)
CREAT SERPL-MCNC: 0.77 MG/DL (ref 0.55–1.02)
EOSINOPHIL # BLD AUTO: 0.2 X10(3)/MCL (ref 0–0.9)
EOSINOPHIL NFR BLD AUTO: 3.1 %
ERYTHROCYTE [DISTWIDTH] IN BLOOD BY AUTOMATED COUNT: 14.7 % (ref 11.5–17)
GFR SERPLBLD CREATININE-BSD FMLA CKD-EPI: >60 MLS/MIN/1.73/M2
GLOBULIN SER-MCNC: 2 GM/DL (ref 2.4–3.5)
GLUCOSE SERPL-MCNC: 95 MG/DL (ref 82–115)
HCT VFR BLD AUTO: 42.7 % (ref 37–47)
HGB BLD-MCNC: 12.9 G/DL (ref 12–16)
IMM GRANULOCYTES # BLD AUTO: 0.02 X10(3)/MCL (ref 0–0.04)
IMM GRANULOCYTES NFR BLD AUTO: 0.3 %
LYMPHOCYTES # BLD AUTO: 2.28 X10(3)/MCL (ref 0.6–4.6)
LYMPHOCYTES NFR BLD AUTO: 35.4 %
MCH RBC QN AUTO: 27 PG (ref 27–31)
MCHC RBC AUTO-ENTMCNC: 30.2 G/DL (ref 33–36)
MCV RBC AUTO: 89.3 FL (ref 80–94)
MONOCYTES # BLD AUTO: 0.62 X10(3)/MCL (ref 0.1–1.3)
MONOCYTES NFR BLD AUTO: 9.6 %
NEUTROPHILS # BLD AUTO: 3.29 X10(3)/MCL (ref 2.1–9.2)
NEUTROPHILS NFR BLD AUTO: 51.1 %
PLATELET # BLD AUTO: 215 X10(3)/MCL (ref 130–400)
PMV BLD AUTO: 9.6 FL (ref 7.4–10.4)
POTASSIUM SERPL-SCNC: 4.5 MMOL/L (ref 3.5–5.1)
PROT SERPL-MCNC: 6.2 GM/DL (ref 5.8–7.6)
RBC # BLD AUTO: 4.78 X10(6)/MCL (ref 4.2–5.4)
SODIUM SERPL-SCNC: 141 MMOL/L (ref 136–145)
WBC # SPEC AUTO: 6.44 X10(3)/MCL (ref 4.5–11.5)

## 2023-07-14 PROCEDURE — 36415 COLL VENOUS BLD VENIPUNCTURE: CPT

## 2023-07-14 PROCEDURE — 80053 COMPREHEN METABOLIC PANEL: CPT

## 2023-07-14 PROCEDURE — 85025 COMPLETE CBC W/AUTO DIFF WBC: CPT

## 2023-07-20 ENCOUNTER — OFFICE VISIT (OUTPATIENT)
Dept: HEMATOLOGY/ONCOLOGY | Facility: CLINIC | Age: 68
End: 2023-07-20
Payer: COMMERCIAL

## 2023-07-20 VITALS
RESPIRATION RATE: 14 BRPM | OXYGEN SATURATION: 98 % | HEIGHT: 69 IN | WEIGHT: 241.5 LBS | DIASTOLIC BLOOD PRESSURE: 81 MMHG | HEART RATE: 69 BPM | TEMPERATURE: 98 F | BODY MASS INDEX: 35.77 KG/M2 | SYSTOLIC BLOOD PRESSURE: 145 MMHG

## 2023-07-20 DIAGNOSIS — C50.112 MALIGNANT NEOPLASM OF CENTRAL PORTION OF LEFT BREAST IN FEMALE, ESTROGEN RECEPTOR POSITIVE: Primary | ICD-10-CM

## 2023-07-20 DIAGNOSIS — M85.80 OSTEOPENIA, UNSPECIFIED LOCATION: ICD-10-CM

## 2023-07-20 DIAGNOSIS — Z17.0 MALIGNANT NEOPLASM OF CENTRAL PORTION OF LEFT BREAST IN FEMALE, ESTROGEN RECEPTOR POSITIVE: Primary | ICD-10-CM

## 2023-07-20 PROCEDURE — 99999 PR PBB SHADOW E&M-EST. PATIENT-LVL IV: CPT | Mod: PBBFAC,,, | Performed by: NURSE PRACTITIONER

## 2023-07-20 PROCEDURE — 1159F MED LIST DOCD IN RCRD: CPT | Mod: CPTII,S$GLB,, | Performed by: NURSE PRACTITIONER

## 2023-07-20 PROCEDURE — 3288F PR FALLS RISK ASSESSMENT DOCUMENTED: ICD-10-PCS | Mod: CPTII,S$GLB,, | Performed by: NURSE PRACTITIONER

## 2023-07-20 PROCEDURE — 99213 PR OFFICE/OUTPT VISIT, EST, LEVL III, 20-29 MIN: ICD-10-PCS | Mod: S$GLB,,, | Performed by: NURSE PRACTITIONER

## 2023-07-20 PROCEDURE — 1126F PR PAIN SEVERITY QUANTIFIED, NO PAIN PRESENT: ICD-10-PCS | Mod: CPTII,S$GLB,, | Performed by: NURSE PRACTITIONER

## 2023-07-20 PROCEDURE — 1160F PR REVIEW ALL MEDS BY PRESCRIBER/CLIN PHARMACIST DOCUMENTED: ICD-10-PCS | Mod: CPTII,S$GLB,, | Performed by: NURSE PRACTITIONER

## 2023-07-20 PROCEDURE — 4010F PR ACE/ARB THEARPY RXD/TAKEN: ICD-10-PCS | Mod: CPTII,S$GLB,, | Performed by: NURSE PRACTITIONER

## 2023-07-20 PROCEDURE — 3077F PR MOST RECENT SYSTOLIC BLOOD PRESSURE >= 140 MM HG: ICD-10-PCS | Mod: CPTII,S$GLB,, | Performed by: NURSE PRACTITIONER

## 2023-07-20 PROCEDURE — 1160F RVW MEDS BY RX/DR IN RCRD: CPT | Mod: CPTII,S$GLB,, | Performed by: NURSE PRACTITIONER

## 2023-07-20 PROCEDURE — 1126F AMNT PAIN NOTED NONE PRSNT: CPT | Mod: CPTII,S$GLB,, | Performed by: NURSE PRACTITIONER

## 2023-07-20 PROCEDURE — 3079F PR MOST RECENT DIASTOLIC BLOOD PRESSURE 80-89 MM HG: ICD-10-PCS | Mod: CPTII,S$GLB,, | Performed by: NURSE PRACTITIONER

## 2023-07-20 PROCEDURE — 3077F SYST BP >= 140 MM HG: CPT | Mod: CPTII,S$GLB,, | Performed by: NURSE PRACTITIONER

## 2023-07-20 PROCEDURE — 3008F BODY MASS INDEX DOCD: CPT | Mod: CPTII,S$GLB,, | Performed by: NURSE PRACTITIONER

## 2023-07-20 PROCEDURE — 4010F ACE/ARB THERAPY RXD/TAKEN: CPT | Mod: CPTII,S$GLB,, | Performed by: NURSE PRACTITIONER

## 2023-07-20 PROCEDURE — 3079F DIAST BP 80-89 MM HG: CPT | Mod: CPTII,S$GLB,, | Performed by: NURSE PRACTITIONER

## 2023-07-20 PROCEDURE — 99999 PR PBB SHADOW E&M-EST. PATIENT-LVL IV: ICD-10-PCS | Mod: PBBFAC,,, | Performed by: NURSE PRACTITIONER

## 2023-07-20 PROCEDURE — 99213 OFFICE O/P EST LOW 20 MIN: CPT | Mod: S$GLB,,, | Performed by: NURSE PRACTITIONER

## 2023-07-20 PROCEDURE — 1101F PT FALLS ASSESS-DOCD LE1/YR: CPT | Mod: CPTII,S$GLB,, | Performed by: NURSE PRACTITIONER

## 2023-07-20 PROCEDURE — 3008F PR BODY MASS INDEX (BMI) DOCUMENTED: ICD-10-PCS | Mod: CPTII,S$GLB,, | Performed by: NURSE PRACTITIONER

## 2023-07-20 PROCEDURE — 1101F PR PT FALLS ASSESS DOC 0-1 FALLS W/OUT INJ PAST YR: ICD-10-PCS | Mod: CPTII,S$GLB,, | Performed by: NURSE PRACTITIONER

## 2023-07-20 PROCEDURE — 3288F FALL RISK ASSESSMENT DOCD: CPT | Mod: CPTII,S$GLB,, | Performed by: NURSE PRACTITIONER

## 2023-07-20 PROCEDURE — 1159F PR MEDICATION LIST DOCUMENTED IN MEDICAL RECORD: ICD-10-PCS | Mod: CPTII,S$GLB,, | Performed by: NURSE PRACTITIONER

## 2023-07-20 RX ORDER — CLINDAMYCIN HYDROCHLORIDE 300 MG/1
CAPSULE ORAL
COMMUNITY
Start: 2023-07-10 | End: 2023-11-02

## 2023-10-02 DIAGNOSIS — Z17.0 MALIGNANT NEOPLASM OF CENTRAL PORTION OF RIGHT BREAST IN FEMALE, ESTROGEN RECEPTOR POSITIVE: ICD-10-CM

## 2023-10-02 DIAGNOSIS — C50.111 MALIGNANT NEOPLASM OF CENTRAL PORTION OF RIGHT BREAST IN FEMALE, ESTROGEN RECEPTOR POSITIVE: ICD-10-CM

## 2023-10-02 RX ORDER — ANASTROZOLE 1 MG/1
TABLET ORAL
Qty: 90 TABLET | Refills: 0 | Status: SHIPPED | OUTPATIENT
Start: 2023-10-02 | End: 2024-01-08

## 2023-10-12 DIAGNOSIS — Z86.69 HISTORY OF COMPLEX PARTIAL EPILEPSY: ICD-10-CM

## 2023-10-12 RX ORDER — LACOSAMIDE 100 MG/1
100 TABLET ORAL EVERY 12 HOURS
Qty: 60 TABLET | Refills: 5 | Status: SHIPPED | OUTPATIENT
Start: 2023-10-12

## 2023-10-30 ENCOUNTER — LAB VISIT (OUTPATIENT)
Dept: LAB | Facility: HOSPITAL | Age: 68
End: 2023-10-30
Attending: INTERNAL MEDICINE
Payer: COMMERCIAL

## 2023-10-30 DIAGNOSIS — M85.80 OSTEOPENIA, UNSPECIFIED LOCATION: ICD-10-CM

## 2023-10-30 DIAGNOSIS — C50.112 MALIGNANT NEOPLASM OF CENTRAL PORTION OF LEFT BREAST IN FEMALE, ESTROGEN RECEPTOR POSITIVE: ICD-10-CM

## 2023-10-30 DIAGNOSIS — Z17.0 MALIGNANT NEOPLASM OF CENTRAL PORTION OF LEFT BREAST IN FEMALE, ESTROGEN RECEPTOR POSITIVE: ICD-10-CM

## 2023-10-30 LAB
ALBUMIN SERPL-MCNC: 4.2 G/DL (ref 3.4–4.8)
ALBUMIN/GLOB SERPL: 1.7 RATIO (ref 1.1–2)
ALP SERPL-CCNC: 72 UNIT/L (ref 40–150)
ALT SERPL-CCNC: 17 UNIT/L (ref 0–55)
AST SERPL-CCNC: 21 UNIT/L (ref 5–34)
BASOPHILS # BLD AUTO: 0.04 X10(3)/MCL
BASOPHILS NFR BLD AUTO: 0.7 %
BILIRUB SERPL-MCNC: 0.9 MG/DL
BUN SERPL-MCNC: 23.9 MG/DL (ref 9.8–20.1)
CALCIUM SERPL-MCNC: 9.5 MG/DL (ref 8.4–10.2)
CHLORIDE SERPL-SCNC: 107 MMOL/L (ref 98–107)
CO2 SERPL-SCNC: 23 MMOL/L (ref 23–31)
CREAT SERPL-MCNC: 0.83 MG/DL (ref 0.55–1.02)
EOSINOPHIL # BLD AUTO: 0.4 X10(3)/MCL (ref 0–0.9)
EOSINOPHIL NFR BLD AUTO: 7.2 %
ERYTHROCYTE [DISTWIDTH] IN BLOOD BY AUTOMATED COUNT: 13.5 % (ref 11.5–17)
GFR SERPLBLD CREATININE-BSD FMLA CKD-EPI: >60 MLS/MIN/1.73/M2
GLOBULIN SER-MCNC: 2.5 GM/DL (ref 2.4–3.5)
GLUCOSE SERPL-MCNC: 101 MG/DL (ref 82–115)
HCT VFR BLD AUTO: 45 % (ref 37–47)
HGB BLD-MCNC: 13.7 G/DL (ref 12–16)
IMM GRANULOCYTES # BLD AUTO: 0.01 X10(3)/MCL (ref 0–0.04)
IMM GRANULOCYTES NFR BLD AUTO: 0.2 %
LYMPHOCYTES # BLD AUTO: 1.53 X10(3)/MCL (ref 0.6–4.6)
LYMPHOCYTES NFR BLD AUTO: 27.4 %
MCH RBC QN AUTO: 26.8 PG (ref 27–31)
MCHC RBC AUTO-ENTMCNC: 30.4 G/DL (ref 33–36)
MCV RBC AUTO: 87.9 FL (ref 80–94)
MONOCYTES # BLD AUTO: 0.62 X10(3)/MCL (ref 0.1–1.3)
MONOCYTES NFR BLD AUTO: 11.1 %
NEUTROPHILS # BLD AUTO: 2.99 X10(3)/MCL (ref 2.1–9.2)
NEUTROPHILS NFR BLD AUTO: 53.4 %
PLATELET # BLD AUTO: 218 X10(3)/MCL (ref 130–400)
PMV BLD AUTO: 10.3 FL (ref 7.4–10.4)
POTASSIUM SERPL-SCNC: 3.8 MMOL/L (ref 3.5–5.1)
PROT SERPL-MCNC: 6.7 GM/DL (ref 5.8–7.6)
RBC # BLD AUTO: 5.12 X10(6)/MCL (ref 4.2–5.4)
SODIUM SERPL-SCNC: 140 MMOL/L (ref 136–145)
WBC # SPEC AUTO: 5.59 X10(3)/MCL (ref 4.5–11.5)

## 2023-10-30 PROCEDURE — 85025 COMPLETE CBC W/AUTO DIFF WBC: CPT

## 2023-10-30 PROCEDURE — 36415 COLL VENOUS BLD VENIPUNCTURE: CPT

## 2023-10-30 PROCEDURE — 80053 COMPREHEN METABOLIC PANEL: CPT

## 2023-11-02 ENCOUNTER — OFFICE VISIT (OUTPATIENT)
Dept: HEMATOLOGY/ONCOLOGY | Facility: CLINIC | Age: 68
End: 2023-11-02
Payer: COMMERCIAL

## 2023-11-02 VITALS
OXYGEN SATURATION: 100 % | BODY MASS INDEX: 34.16 KG/M2 | RESPIRATION RATE: 14 BRPM | DIASTOLIC BLOOD PRESSURE: 80 MMHG | HEIGHT: 68 IN | WEIGHT: 225.38 LBS | HEART RATE: 63 BPM | TEMPERATURE: 98 F | SYSTOLIC BLOOD PRESSURE: 143 MMHG

## 2023-11-02 DIAGNOSIS — Z17.0 MALIGNANT NEOPLASM OF CENTRAL PORTION OF LEFT BREAST IN FEMALE, ESTROGEN RECEPTOR POSITIVE: Primary | ICD-10-CM

## 2023-11-02 DIAGNOSIS — M85.80 OSTEOPENIA, UNSPECIFIED LOCATION: ICD-10-CM

## 2023-11-02 DIAGNOSIS — C50.112 MALIGNANT NEOPLASM OF CENTRAL PORTION OF LEFT BREAST IN FEMALE, ESTROGEN RECEPTOR POSITIVE: Primary | ICD-10-CM

## 2023-11-02 PROCEDURE — 1101F PR PT FALLS ASSESS DOC 0-1 FALLS W/OUT INJ PAST YR: ICD-10-PCS | Mod: CPTII,S$GLB,, | Performed by: NURSE PRACTITIONER

## 2023-11-02 PROCEDURE — 3008F BODY MASS INDEX DOCD: CPT | Mod: CPTII,S$GLB,, | Performed by: NURSE PRACTITIONER

## 2023-11-02 PROCEDURE — 3077F PR MOST RECENT SYSTOLIC BLOOD PRESSURE >= 140 MM HG: ICD-10-PCS | Mod: CPTII,S$GLB,, | Performed by: NURSE PRACTITIONER

## 2023-11-02 PROCEDURE — 1126F PR PAIN SEVERITY QUANTIFIED, NO PAIN PRESENT: ICD-10-PCS | Mod: CPTII,S$GLB,, | Performed by: NURSE PRACTITIONER

## 2023-11-02 PROCEDURE — 3079F DIAST BP 80-89 MM HG: CPT | Mod: CPTII,S$GLB,, | Performed by: NURSE PRACTITIONER

## 2023-11-02 PROCEDURE — 3288F PR FALLS RISK ASSESSMENT DOCUMENTED: ICD-10-PCS | Mod: CPTII,S$GLB,, | Performed by: NURSE PRACTITIONER

## 2023-11-02 PROCEDURE — 99999 PR PBB SHADOW E&M-EST. PATIENT-LVL IV: ICD-10-PCS | Mod: PBBFAC,,, | Performed by: NURSE PRACTITIONER

## 2023-11-02 PROCEDURE — 99214 PR OFFICE/OUTPT VISIT, EST, LEVL IV, 30-39 MIN: ICD-10-PCS | Mod: S$GLB,,, | Performed by: NURSE PRACTITIONER

## 2023-11-02 PROCEDURE — 3079F PR MOST RECENT DIASTOLIC BLOOD PRESSURE 80-89 MM HG: ICD-10-PCS | Mod: CPTII,S$GLB,, | Performed by: NURSE PRACTITIONER

## 2023-11-02 PROCEDURE — 4010F ACE/ARB THERAPY RXD/TAKEN: CPT | Mod: CPTII,S$GLB,, | Performed by: NURSE PRACTITIONER

## 2023-11-02 PROCEDURE — 99214 OFFICE O/P EST MOD 30 MIN: CPT | Mod: S$GLB,,, | Performed by: NURSE PRACTITIONER

## 2023-11-02 PROCEDURE — 3077F SYST BP >= 140 MM HG: CPT | Mod: CPTII,S$GLB,, | Performed by: NURSE PRACTITIONER

## 2023-11-02 PROCEDURE — 1160F PR REVIEW ALL MEDS BY PRESCRIBER/CLIN PHARMACIST DOCUMENTED: ICD-10-PCS | Mod: CPTII,S$GLB,, | Performed by: NURSE PRACTITIONER

## 2023-11-02 PROCEDURE — 1101F PT FALLS ASSESS-DOCD LE1/YR: CPT | Mod: CPTII,S$GLB,, | Performed by: NURSE PRACTITIONER

## 2023-11-02 PROCEDURE — 3008F PR BODY MASS INDEX (BMI) DOCUMENTED: ICD-10-PCS | Mod: CPTII,S$GLB,, | Performed by: NURSE PRACTITIONER

## 2023-11-02 PROCEDURE — 1160F RVW MEDS BY RX/DR IN RCRD: CPT | Mod: CPTII,S$GLB,, | Performed by: NURSE PRACTITIONER

## 2023-11-02 PROCEDURE — 1159F MED LIST DOCD IN RCRD: CPT | Mod: CPTII,S$GLB,, | Performed by: NURSE PRACTITIONER

## 2023-11-02 PROCEDURE — 1126F AMNT PAIN NOTED NONE PRSNT: CPT | Mod: CPTII,S$GLB,, | Performed by: NURSE PRACTITIONER

## 2023-11-02 PROCEDURE — 4010F PR ACE/ARB THEARPY RXD/TAKEN: ICD-10-PCS | Mod: CPTII,S$GLB,, | Performed by: NURSE PRACTITIONER

## 2023-11-02 PROCEDURE — 3288F FALL RISK ASSESSMENT DOCD: CPT | Mod: CPTII,S$GLB,, | Performed by: NURSE PRACTITIONER

## 2023-11-02 PROCEDURE — 1159F PR MEDICATION LIST DOCUMENTED IN MEDICAL RECORD: ICD-10-PCS | Mod: CPTII,S$GLB,, | Performed by: NURSE PRACTITIONER

## 2023-11-02 PROCEDURE — 99999 PR PBB SHADOW E&M-EST. PATIENT-LVL IV: CPT | Mod: PBBFAC,,, | Performed by: NURSE PRACTITIONER

## 2023-11-02 RX ORDER — IBANDRONATE SODIUM 150 MG/1
150 TABLET, FILM COATED ORAL
Qty: 1 TABLET | Refills: 11 | Status: SHIPPED | OUTPATIENT
Start: 2023-11-02 | End: 2024-11-01

## 2023-11-02 NOTE — PROGRESS NOTES
Subjective:       Patient ID: Yoli Burnett is a 68 y.o. female.    Surgeon: Dr. Mary Painter  PCP: Dr. Valerie Quispe    Left Breast Cancer Stage IA ([id number])--Diagnosed 21  Biopsy/pathology: Left breast 11:00 mass biopsy done 21--invasive carcinoma with mixed ductal and lobular features, Grade 1, carcinoma present on 5 cores and measures at least 7mm, ER 95%, MN 96%, Her 2 0 by IHC, negative, Ki67 low 5%.   Surgery/pathology: Left breast lumpectomy with SLN biopsy done 22--invasive ductal carcinoma 8mm, Grade 1 with organizing biopsy site changes, no LVI, no DCIS, resection margin clear, proliferative fibrocystic changes with focal atypical lobular hyperplasia and LCIS, 2 SLNs negative.   Imagin. Screening MMG bilateral 21--focal asymmetry with possible architectural distortion in the left beast UIQ middle depth needs further evaluation, BIRADS 0.   2. Left Diagnostic MMG/US done 21--irregular, spiculated mass in 11:00 left breast, posterior depth, highly suggestive of malignancy, measures 11mm, BIRADS 5.     DEXA:  20--AP spine T= -0.3, Femur neck left -2.1, right -1.8, total femur left -2.1, right -2.2 c/w osteopenia.   22--AP spine T= 0.8, Femur neck left -2.3, right -1.9, total femur left -2.3, right -1.9, total femur right -2.1 c/w osteopenia, some improved, some worse.    Treatment History:  Adjuvant XRT x 4 weeks, completed on 22.     Current treatment plan:   1. Femara started on 22, changed to Arimidex 10/19/22 for HA.  2. Fosamax weekly started 2022. Changed to Boniva on 23.     Chief Complaint: Other Misc (Pt reports no concerns today.)    HPI   The patient presents today for scheduled follow-up for her breast cancer. She is doing better after changing to the Armidex as far as for Headaches. Still has the joint pains in her knees and is planning to have double knee replacement in the future. She is otherwise doing okay. Denies any  new pain, headaches or changes in her bowels. Scheduled for her MMG next month. 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 No other problems reported.     Past Medical History:   Diagnosis Date    Breast cancer     H/O partial mastectomy     Hyperlipidemia     Hypertension     Menopause     Seizures       Review of patient's allergies indicates:  No Known Allergies     Current Outpatient Medications:     anastrozole (ARIMIDEX) 1 mg Tab, TAKE 1 TABLET BY MOUTH EVERY DAY, Disp: 90 tablet, Rfl: 0    atorvastatin (LIPITOR) 20 MG tablet, Take 20 mg by mouth., Disp: , Rfl:     cholecalciferol, vitamin D3, (VITAMIN D3) 50 mcg (2,000 unit) Cap capsule, Take by mouth once daily., Disp: , Rfl:     lacosamide (VIMPAT) 100 mg Tab, Take 1 tablet (100 mg total) by mouth every 12 (twelve) hours., Disp: 60 tablet, Rfl: 5    losartan (COZAAR) 25 MG tablet, Take 25 mg by mouth., Disp: , Rfl:      Review of Systems   Constitutional:  Negative for appetite change and unexpected weight change.   HENT:  Negative for mouth sores.    Eyes:  Negative for visual disturbance.   Respiratory:  Negative for cough and shortness of breath.    Cardiovascular:  Negative for chest pain.   Gastrointestinal:  Negative for abdominal pain and diarrhea.   Genitourinary:  Negative for frequency.   Musculoskeletal:  Positive for arthralgias and leg pain. Negative for back pain.   Integumentary:  Negative for rash.   Neurological:  Negative for headaches.   Hematological:  Negative for adenopathy.   Psychiatric/Behavioral:  The patient is not nervous/anxious.          Vitals:    11/02/23 0922   BP: (!) 143/80   Pulse: 63   Resp: 14   Temp: 97.5 °F (36.4 °C)     Physical Exam  Vitals reviewed.   Constitutional:       Appearance: Normal appearance. She is overweight.   HENT:      Head: Normocephalic.   Eyes:      General: Lids are normal. Vision grossly intact.      Extraocular Movements: Extraocular movements intact.      Conjunctiva/sclera: Conjunctivae normal.    Cardiovascular:      Rate and Rhythm: Normal rate and regular rhythm.      Pulses: Normal pulses.      Heart sounds: Normal heart sounds, S1 normal and S2 normal.   Pulmonary:      Effort: Pulmonary effort is normal.      Breath sounds: Normal breath sounds.   Chest:      Comments: Left breast lumpectomy incision and axillary incision intact. No palpable masses or lesions bilaterally  Abdominal:      General: Abdomen is protuberant. Bowel sounds are normal.      Palpations: Abdomen is soft.   Musculoskeletal:      Cervical back: Normal range of motion and neck supple.      Right lower leg: No edema.      Left lower leg: No edema.   Skin:     General: Skin is warm and moist.      Capillary Refill: Capillary refill takes less than 2 seconds.   Neurological:      General: No focal deficit present.      Mental Status: She is alert and oriented to person, place, and time.   Psychiatric:         Attention and Perception: Attention normal.         Mood and Affect: Mood normal.         Speech: Speech normal.         Behavior: Behavior normal. Behavior is cooperative.         Cognition and Memory: Cognition normal.         Judgment: Judgment normal.       ECOG SCORE    0 - Fully active-able to carry on all pre-disease performance without restriction       Lab Visit on 10/30/2023   Component Date Value    Sodium Level 10/30/2023 140     Potassium Level 10/30/2023 3.8     Chloride 10/30/2023 107     Carbon Dioxide 10/30/2023 23     Glucose Level 10/30/2023 101     Blood Urea Nitrogen 10/30/2023 23.9 (H)     Creatinine 10/30/2023 0.83     Calcium Level Total 10/30/2023 9.5     Protein Total 10/30/2023 6.7     Albumin Level 10/30/2023 4.2     Globulin 10/30/2023 2.5     Albumin/Globulin Ratio 10/30/2023 1.7     Bilirubin Total 10/30/2023 0.9     Alkaline Phosphatase 10/30/2023 72     Alanine Aminotransferase 10/30/2023 17     Aspartate Aminotransfera* 10/30/2023 21     eGFR 10/30/2023 >60     WBC 10/30/2023 5.59     RBC  10/30/2023 5.12     Hgb 10/30/2023 13.7     Hct 10/30/2023 45.0     MCV 10/30/2023 87.9     MCH 10/30/2023 26.8 (L)     MCHC 10/30/2023 30.4 (L)     RDW 10/30/2023 13.5     Platelet 10/30/2023 218     MPV 10/30/2023 10.3     Neut % 10/30/2023 53.4     Lymph % 10/30/2023 27.4     Mono % 10/30/2023 11.1     Eos % 10/30/2023 7.2     Basophil % 10/30/2023 0.7     Lymph # 10/30/2023 1.53     Neut # 10/30/2023 2.99     Mono # 10/30/2023 0.62     Eos # 10/30/2023 0.40     Baso # 10/30/2023 0.04     IG# 10/30/2023 0.01     IG% 10/30/2023 0.2           Assessment:       1. Malignant neoplasm of central portion of left breast in female, estrogen receptor positive    2. Osteopenia, unspecified location         Plan:     Patient with stage IA left breast cancer s/p lumpectomy done 1/7/22. Pathology showed 8mm IDCA, Grade 1 with clear margins, 2 negative SLNs. Tumor strongly ER and KS positive and Her2 negative.  Per NCCN guidelines, T1bN0 low grade tumors without LVI were not included in [employer] and are treated with endocrine therapy alone due to low risk.  Patient completed adjuvant radiation 4/8/22.  Recommended treatment with AI x 5-10 years.    Currently patient is doing well without any signs or symptoms to suggest disease recurrence.  Recent labs all good.   Started Femara on 4/22/22. Changed to Arimidex in 10/2022 for HA and she is tolerating better.  Repeat DEXA 12/5/22 with ongoing osteopenia. Started Fosamax in December 2022. Changed to Boniva today due to patient preference.  Plan to repeat DEXA in 12/2024.  Continue Arimidex.  Continue Calcium + Vitamin D3 twice daily.   Continue routine surveillance visits every 3 months. Plan to change visits to every 6 months after her next appointment.   Bilateral MMG 12/5/22 benign. Plan to repeat in 12/23. Scheduled by her surgeon.    All questions answered at this time.      WHITNEY Cardenas

## 2023-12-04 ENCOUNTER — HOSPITAL ENCOUNTER (OUTPATIENT)
Dept: RADIOLOGY | Facility: HOSPITAL | Age: 68
Discharge: HOME | End: 2023-12-04
Attending: PHYSICIAN ASSISTANT
Payer: COMMERCIAL

## 2023-12-04 DIAGNOSIS — C50.112 MALIGNANT NEOPLASM OF CENTRAL PORTION OF LEFT BREAST IN FEMALE, ESTROGEN RECEPTOR POSITIVE: ICD-10-CM

## 2023-12-04 DIAGNOSIS — Z17.0 MALIGNANT NEOPLASM OF CENTRAL PORTION OF LEFT BREAST IN FEMALE, ESTROGEN RECEPTOR POSITIVE: ICD-10-CM

## 2023-12-04 PROCEDURE — 77066 MAMMO DIGITAL DIAGNOSTIC BILAT WITH TOMO: ICD-10-PCS | Mod: 26,,, | Performed by: RADIOLOGY

## 2023-12-04 PROCEDURE — 77066 DX MAMMO INCL CAD BI: CPT | Mod: 26,,, | Performed by: RADIOLOGY

## 2023-12-04 PROCEDURE — 76641 ULTRASOUND BREAST COMPLETE: CPT | Mod: TC,RT

## 2023-12-04 PROCEDURE — 77066 DX MAMMO INCL CAD BI: CPT | Mod: TC

## 2023-12-04 PROCEDURE — 77062 MAMMO DIGITAL DIAGNOSTIC BILAT WITH TOMO: ICD-10-PCS | Mod: 26,,, | Performed by: RADIOLOGY

## 2023-12-04 PROCEDURE — 76641 US BREAST RIGHT COMPLETE: ICD-10-PCS | Mod: 26,RT,, | Performed by: RADIOLOGY

## 2023-12-04 PROCEDURE — 77062 BREAST TOMOSYNTHESIS BI: CPT | Mod: 26,,, | Performed by: RADIOLOGY

## 2023-12-04 PROCEDURE — 76641 ULTRASOUND BREAST COMPLETE: CPT | Mod: 26,RT,, | Performed by: RADIOLOGY

## 2023-12-14 ENCOUNTER — OFFICE VISIT (OUTPATIENT)
Dept: NEUROLOGY | Facility: CLINIC | Age: 68
End: 2023-12-14
Payer: COMMERCIAL

## 2023-12-14 VITALS
WEIGHT: 225 LBS | HEART RATE: 72 BPM | SYSTOLIC BLOOD PRESSURE: 128 MMHG | DIASTOLIC BLOOD PRESSURE: 81 MMHG | BODY MASS INDEX: 34.1 KG/M2 | HEIGHT: 68 IN

## 2023-12-14 DIAGNOSIS — Z86.011 HISTORY OF CEREBRAL MENINGIOMA: Primary | ICD-10-CM

## 2023-12-14 DIAGNOSIS — G40.209 COMPLEX PARTIAL EPILEPSY: ICD-10-CM

## 2023-12-14 PROCEDURE — 3288F FALL RISK ASSESSMENT DOCD: CPT | Mod: CPTII,S$GLB,, | Performed by: PSYCHIATRY & NEUROLOGY

## 2023-12-14 PROCEDURE — 3288F PR FALLS RISK ASSESSMENT DOCUMENTED: ICD-10-PCS | Mod: CPTII,S$GLB,, | Performed by: PSYCHIATRY & NEUROLOGY

## 2023-12-14 PROCEDURE — 3008F PR BODY MASS INDEX (BMI) DOCUMENTED: ICD-10-PCS | Mod: CPTII,S$GLB,, | Performed by: PSYCHIATRY & NEUROLOGY

## 2023-12-14 PROCEDURE — 3079F DIAST BP 80-89 MM HG: CPT | Mod: CPTII,S$GLB,, | Performed by: PSYCHIATRY & NEUROLOGY

## 2023-12-14 PROCEDURE — 99213 PR OFFICE/OUTPT VISIT, EST, LEVL III, 20-29 MIN: ICD-10-PCS | Mod: S$GLB,,, | Performed by: PSYCHIATRY & NEUROLOGY

## 2023-12-14 PROCEDURE — 4010F ACE/ARB THERAPY RXD/TAKEN: CPT | Mod: CPTII,S$GLB,, | Performed by: PSYCHIATRY & NEUROLOGY

## 2023-12-14 PROCEDURE — 4010F PR ACE/ARB THEARPY RXD/TAKEN: ICD-10-PCS | Mod: CPTII,S$GLB,, | Performed by: PSYCHIATRY & NEUROLOGY

## 2023-12-14 PROCEDURE — 3074F PR MOST RECENT SYSTOLIC BLOOD PRESSURE < 130 MM HG: ICD-10-PCS | Mod: CPTII,S$GLB,, | Performed by: PSYCHIATRY & NEUROLOGY

## 2023-12-14 PROCEDURE — 99999 PR PBB SHADOW E&M-EST. PATIENT-LVL III: CPT | Mod: PBBFAC,,, | Performed by: PSYCHIATRY & NEUROLOGY

## 2023-12-14 PROCEDURE — 3079F PR MOST RECENT DIASTOLIC BLOOD PRESSURE 80-89 MM HG: ICD-10-PCS | Mod: CPTII,S$GLB,, | Performed by: PSYCHIATRY & NEUROLOGY

## 2023-12-14 PROCEDURE — 1101F PR PT FALLS ASSESS DOC 0-1 FALLS W/OUT INJ PAST YR: ICD-10-PCS | Mod: CPTII,S$GLB,, | Performed by: PSYCHIATRY & NEUROLOGY

## 2023-12-14 PROCEDURE — 99999 PR PBB SHADOW E&M-EST. PATIENT-LVL III: ICD-10-PCS | Mod: PBBFAC,,, | Performed by: PSYCHIATRY & NEUROLOGY

## 2023-12-14 PROCEDURE — 3008F BODY MASS INDEX DOCD: CPT | Mod: CPTII,S$GLB,, | Performed by: PSYCHIATRY & NEUROLOGY

## 2023-12-14 PROCEDURE — 3074F SYST BP LT 130 MM HG: CPT | Mod: CPTII,S$GLB,, | Performed by: PSYCHIATRY & NEUROLOGY

## 2023-12-14 PROCEDURE — 1101F PT FALLS ASSESS-DOCD LE1/YR: CPT | Mod: CPTII,S$GLB,, | Performed by: PSYCHIATRY & NEUROLOGY

## 2023-12-14 PROCEDURE — 99213 OFFICE O/P EST LOW 20 MIN: CPT | Mod: S$GLB,,, | Performed by: PSYCHIATRY & NEUROLOGY

## 2023-12-14 NOTE — PROGRESS NOTES
Chief Complaint   Patient presents with    Seizures     Denies seizure like activity since last OV currently Vimpat 100 mg BID        This is a 68 y.o. female here for follow up for seizures. She is doing well, denies seizures. S/p partial mastectomy. On vimpat 100 BID no side effects. Recall did not tolerate keppra (depression) or dilantin.     Medication List with Changes/Refills   Current Medications    ANASTROZOLE (ARIMIDEX) 1 MG TAB    TAKE 1 TABLET BY MOUTH EVERY DAY    ATORVASTATIN (LIPITOR) 20 MG TABLET    Take 20 mg by mouth once daily.    CHOLECALCIFEROL, VITAMIN D3, (VITAMIN D3) 50 MCG (2,000 UNIT) CAP CAPSULE    Take by mouth once daily.    IBANDRONATE (BONIVA) 150 MG TABLET    Take 1 tablet (150 mg total) by mouth every 30 days.    LACOSAMIDE (VIMPAT) 100 MG TAB    Take 1 tablet (100 mg total) by mouth every 12 (twelve) hours.    LOSARTAN (COZAAR) 25 MG TABLET    Take 25 mg by mouth once daily.        Vitals:    12/14/23 0830   BP: 128/81   Pulse: 72      NAD  Alert and oriented  Cognition and perception intact  No aphasia  EOMI  No facial asymmetry  No dysarthria  Moves all extremities symmetrically  No gross coordination abnormalities  Gait normal       1. History of cerebral meningioma  Overview:  repaired/embolization years ago.  repaired/embolization years ago.      2. Complex partial epilepsy         Cont vimpat 100 BID  MRI for surveillance meningioma recurrence

## 2024-01-02 NOTE — PROGRESS NOTES
[facility] - [facility] [place] [facility]  Breast Surgical Oncology  Follow-Up Patient Office Visit       Referring Provider: No ref. provider found   PCP: Leslie Quispe MD   Care Team:     Chief Complaint:   Chief Complaint   Patient presents with    Follow-up     Patient has no breast related concerns       Subjective:      Cancer Staging   Malignant neoplasm of central portion of left breast in female, estrogen receptor positive  Staging form: Breast, AJCC 8th Edition  - Pathologic stage from 2021: Stage IA (pT1b, pN0(sn), cM0, G1, ER+, WY+, HER2-) - Signed by Terry Howell FNP on 2022    Treatment History:  1. S/P left breast partial mastectomy and SLNB 2022  2. Adjuvant XRT x 4 weeks, completed on 22.   3. AI expected X 5-10 years. Femara started on 22. Changed to Arimidex 10/19/22 for HA    Interval History:  2024 - Yoli Burnett is here for follow up for breast cancer. She is doing well and has no breast concerns. She is here today for clinical breast exam. BL DG MG and R breast US was done last month and is benign.    HPI:  Yoli Burnett initially presents on 21 at age 66 Years with AJCC 8th ed clinical anatomic stage IA / prognostic stage IA (cT1c cN0 M0) Left breast 11 o'clock posterior depth infiltrating mammary carcinoma with mixed ductal and lobular features grade 1 ER 95%, WY 96%, HER2 0 - negative on IHC and Ki 67 - 5%.  She is s/p left breast partial mastectomy and SLNB on 2021. Surgical pathology revealed grade 1 invasive ductal carcinoma measuring 8 mm. No DCIS identified. Resection margin clear. Other findings include atypical lobular hyperplasia and lobular carcinoma in situ. Two sentinel lymph nodes negative for metastases.    A detailed patient history was obtained and reviewed.    Imagin. 2021 BL SC MG at [facility] - which revealed in the left breast a focal asymmetry in the left upper inner quadrant middle depth with  possible associated architectural distortion and no other significant finding in the right breast. BI-RADS 0: additional imaging needed  2. 12/7/2021 Left DG MG / US at [facility] - which revealed on L MG a 1.1 cm irregular mass with spiculated margins in the 11 o'clock posterior depth. On L US at 11 o'clock 5 cm FN a subtle 1.1 cm x 0.8 cm x 0.9 cm irregular, hypoechoic mass with spiculated margins is noted and corresponds to the mammographic finding. BI-RADS 5: highly suspicious and biopsy was recommended.  3. 7/5/2022 L DG MG - BIRADS 2: BENIGN: There is new, expected post-therapy change of the left breast and left axilla.  There is scarring, fat necrosis, and surgical clips in the 11:00 axis posterior depth lumpectomy bed.  There are postsurgical changes in the left axilla related to prior surgical lymph node sampling.  Mild diffuse left breast skin thickening is related to prior radiation therapy.    4. 12/5/2022 BL DG MG - BIARDS 2: BENIGN: No suspicious masses, calcifications, or other signs of malignancy are identified.  Benign post lumpectomy changes are present in the left breast.  Postsurgical changes are noted in the left axilla at site of prior sentinel lymph node biopsy.  There has been no significant interval change.    5. 12/4/2023 BL DG MG and R breast US - BIRADS 2: BENIGN: Stable, benign post lumpectomy changes are present in the left breast. The suspected focal asymmetry in the upper-outer quadrant of the right breast and the asymmetry in the medial right breast do not persist with additional views and are thus considered benign. On R breast US, A 3 x 2 x 3 mm benign, simple cyst is incidentally noted in the 10:00 right breast, 3 cm from the nipple.  A band of benign, echogenic fibroglandular tissue is imaged in the 11:00 right breast, 6 cm from the nipple.     Pathology:  1. 12/9/2021 Stereotactic-guided core biopsy Left Breast 11 o'clock posterior depth - Invasive carcinoma with mixed ductal and  lobular features, grade 1  ER 95%  [place] 96%  HER2 Arvin 0 on IHC  Ki 67 5% (low)    2. 2022 L Partial mastectomy and SLNB - grade 1 invasive ductal carcinoma measuring 8 mm. No DCIS identified. Resection margin clear. Other findings include atypical lobular hyperplasia and lobular carcinoma in situ. Two sentinel lymph nodes negative for metastases.    OB/GYN History:  Menarche Onset: 13  Menopause: Post, at age:  Hormonal birth control (duration): no   Pregnancies: 3  Age at first pregnancy: 27  Child births: 3  Breastfeeding duration: no   Hysterectomy: no  Oophorectomy: no  HRT: no    Other:  # of breast biopsies (when and pathology results): none  MG breast density: Category B (Scattered fibroglandular)  Prior thoracic RT: none  Genetic testing: none  Ashkenazi Orthodox descent: No    Family History:  Family History   Problem Relation Age of Onset    Osteoporosis Mother     Alzheimer's disease Mother     Hypertension Mother     Osteoarthritis Mother     Arthritis Mother     Heart disease Father     Hypertension Father     Heart attack Father     Osteoporosis Sister    Denies family history of breast cancer.     Patient History:  Past Medical History:   Diagnosis Date    Breast cancer     H/O partial mastectomy     Hyperlipidemia     Hypertension     Menopause     Seizures        Past Surgical History:   Procedure Laterality Date    BRAIN SURGERY      BREAST SURGERY       SECTION      KNEE SURGERY      SEDATION, FOR RADIATION THERAPY      SINUS SURGERY         Social History     Socioeconomic History    Marital status:    Tobacco Use    Smoking status: Never    Smokeless tobacco: Never   Substance and Sexual Activity    Alcohol use: Not Currently     Comment: Rarely    Drug use: Never    Sexual activity: Yes     Partners: Male     Birth control/protection: None       Immunization History   Administered Date(s) Administered    COVID-19, MRNA, LN-S, PF (Pfizer) (Purple Cap) 2021, 2021        Medications/Allergies:  Current Outpatient Medications on File Prior to Visit   Medication Sig Dispense Refill    anastrozole (ARIMIDEX) 1 mg Tab TAKE 1 TABLET BY MOUTH EVERY DAY 90 tablet 0    atorvastatin (LIPITOR) 20 MG tablet Take 20 mg by mouth once daily.      cholecalciferol, vitamin D3, (VITAMIN D3) 50 mcg (2,000 unit) Cap capsule Take by mouth once daily.      ibandronate (BONIVA) 150 mg tablet Take 1 tablet (150 mg total) by mouth every 30 days. 1 tablet 11    lacosamide (VIMPAT) 100 mg Tab Take 1 tablet (100 mg total) by mouth every 12 (twelve) hours. 60 tablet 5    losartan (COZAAR) 25 MG tablet Take 25 mg by mouth once daily.       No current facility-administered medications on file prior to visit.       Review of patient's allergies indicates:  No Known Allergies    Review of Systems:  Pertinent items are noted in HPI.     Objective:     Vitals:  Vitals:    01/09/24 0744   BP: (!) 149/77   Pulse: 70   Resp: 18   Temp: 98.2 °F (36.8 °C)             Body mass index is 34.14 kg/m².     Physical Exam:  General: The patient is awake, alert and oriented times three. The patient is well nourished and in no acute distress.  Neck: There is no evidence of palpable cervical, supraclavicular or axillary adenopathy. The neck is supple. The thyroid is not enlarged.  Musculoskeletal: The patient has a normal range of motion of her bilateral upper extremities.  Chest: Examination of the chest wall fails to reveal any obvious abnormalities. Nonlabored breathing, symmetric expansion.  Breast:  Right: Examination of right breast fails to reveal any dominant masses or areas of significant focal nodularity. The nipple is everted without evidence of discharge. There is no skin dimpling with movement of the pectoralis. There are no significant skin changes overlying the breast.   Left: Examination of the left breast fails to reveal any dominant masses or areas of significant focal nodularity. The nipple is everted  without evidence of discharge. There is no skin dimpling with movement of the pectoralis. There are no significant skin changes overlying the breast.  Abdomen: The abdomen is soft, flat, nontender and nondistended.  Integumentary: no rashes or skin lesions present  Neurologic: cranial nerves intact, no signs of peripheral neurological deficit, motor/sensory function intact      Assessment and Plan:       Yoli was seen today for follow-up.    Diagnoses and all orders for this visit:    Screening mammogram, encounter for  -     Mammo Digital Screening Bilat w/ Jett; Future    Malignant neoplasm of central portion of left breast in female, estrogen receptor positive    History of partial mastectomy of left breast            Plan:       BL SCR MG due Dec 2024. RTC in 1 year for CBE.    RTC in 6 months for CBE.    Continue AI and follow up with Oncology.    Healthy lifestyle guidelines were reviewed. She was encouraged to engage in regular exercise, maintain a healthy body weight, and avoid excessive alcohol consumption. Healthy nutritional guidelines were also discussed. Self-breast examination was reviewed with the patient in detail and she was encouraged to perform this on a monthly basis.        All of her questions were answered. She was advised to call if she develops any questions or concerns.    Jelly Richardson PA-C                --------------------------------------------------------------------------------------------------------------  Total time on the date of the visit ranged from 20-29 mins (62136). Total time includes both face-to-face and non-face-to-face time personally spent by myself on the day of the visit.    Non-face-to-face time included:  _X_ preparing to see the patient such as reviewing the patient record  __ obtaining and reviewing separately obtained history  _X_ independently interpreting results  _X_ documenting clinical information in electronic health record.    Face-to-face time  included:  _X_ performing an appropriate history and examination  _X_ communicating results to the patient  _X_ counseling and educating the patient  __ ordering appropriate medications  _x_ ordering appropriate tests  _X_ ordering appropriate procedures (including follow-up)  _X_ answering any questions the patient had    Total Time spent on date of visit: 30 minutes

## 2024-01-08 DIAGNOSIS — Z17.0 MALIGNANT NEOPLASM OF CENTRAL PORTION OF RIGHT BREAST IN FEMALE, ESTROGEN RECEPTOR POSITIVE: ICD-10-CM

## 2024-01-08 DIAGNOSIS — C50.111 MALIGNANT NEOPLASM OF CENTRAL PORTION OF RIGHT BREAST IN FEMALE, ESTROGEN RECEPTOR POSITIVE: ICD-10-CM

## 2024-01-08 RX ORDER — ANASTROZOLE 1 MG/1
TABLET ORAL
Qty: 90 TABLET | Refills: 0 | Status: SHIPPED | OUTPATIENT
Start: 2024-01-08

## 2024-01-09 ENCOUNTER — OFFICE VISIT (OUTPATIENT)
Dept: SURGERY | Facility: CLINIC | Age: 69
End: 2024-01-09
Payer: COMMERCIAL

## 2024-01-09 VITALS
TEMPERATURE: 98 F | BODY MASS INDEX: 34.02 KG/M2 | SYSTOLIC BLOOD PRESSURE: 149 MMHG | RESPIRATION RATE: 18 BRPM | HEIGHT: 68 IN | DIASTOLIC BLOOD PRESSURE: 77 MMHG | HEART RATE: 70 BPM | OXYGEN SATURATION: 97 % | WEIGHT: 224.5 LBS

## 2024-01-09 DIAGNOSIS — Z12.31 SCREENING MAMMOGRAM, ENCOUNTER FOR: Primary | ICD-10-CM

## 2024-01-09 DIAGNOSIS — Z17.0 MALIGNANT NEOPLASM OF CENTRAL PORTION OF LEFT BREAST IN FEMALE, ESTROGEN RECEPTOR POSITIVE: ICD-10-CM

## 2024-01-09 DIAGNOSIS — Z90.12 HISTORY OF PARTIAL MASTECTOMY OF LEFT BREAST: ICD-10-CM

## 2024-01-09 DIAGNOSIS — C50.112 MALIGNANT NEOPLASM OF CENTRAL PORTION OF LEFT BREAST IN FEMALE, ESTROGEN RECEPTOR POSITIVE: ICD-10-CM

## 2024-01-09 PROCEDURE — 1159F MED LIST DOCD IN RCRD: CPT | Mod: CPTII,S$GLB,, | Performed by: PHYSICIAN ASSISTANT

## 2024-01-09 PROCEDURE — 1160F RVW MEDS BY RX/DR IN RCRD: CPT | Mod: CPTII,S$GLB,, | Performed by: PHYSICIAN ASSISTANT

## 2024-01-09 PROCEDURE — 3008F BODY MASS INDEX DOCD: CPT | Mod: CPTII,S$GLB,, | Performed by: PHYSICIAN ASSISTANT

## 2024-01-09 PROCEDURE — 99999 PR PBB SHADOW E&M-EST. PATIENT-LVL IV: CPT | Mod: PBBFAC,,, | Performed by: PHYSICIAN ASSISTANT

## 2024-01-09 PROCEDURE — 3077F SYST BP >= 140 MM HG: CPT | Mod: CPTII,S$GLB,, | Performed by: PHYSICIAN ASSISTANT

## 2024-01-09 PROCEDURE — 3078F DIAST BP <80 MM HG: CPT | Mod: CPTII,S$GLB,, | Performed by: PHYSICIAN ASSISTANT

## 2024-01-09 PROCEDURE — 1126F AMNT PAIN NOTED NONE PRSNT: CPT | Mod: CPTII,S$GLB,, | Performed by: PHYSICIAN ASSISTANT

## 2024-01-09 PROCEDURE — 99214 OFFICE O/P EST MOD 30 MIN: CPT | Mod: S$GLB,,, | Performed by: PHYSICIAN ASSISTANT

## 2024-03-07 ENCOUNTER — OFFICE VISIT (OUTPATIENT)
Dept: HEMATOLOGY/ONCOLOGY | Facility: CLINIC | Age: 69
End: 2024-03-07
Payer: COMMERCIAL

## 2024-03-07 ENCOUNTER — LAB VISIT (OUTPATIENT)
Dept: LAB | Facility: HOSPITAL | Age: 69
End: 2024-03-07
Attending: NURSE PRACTITIONER
Payer: COMMERCIAL

## 2024-03-07 VITALS
RESPIRATION RATE: 14 BRPM | HEART RATE: 63 BPM | SYSTOLIC BLOOD PRESSURE: 153 MMHG | WEIGHT: 231.31 LBS | TEMPERATURE: 98 F | BODY MASS INDEX: 35.06 KG/M2 | DIASTOLIC BLOOD PRESSURE: 79 MMHG | HEIGHT: 68 IN | OXYGEN SATURATION: 98 %

## 2024-03-07 DIAGNOSIS — Z79.811 LONG TERM CURRENT USE OF AROMATASE INHIBITOR: ICD-10-CM

## 2024-03-07 DIAGNOSIS — C50.112 MALIGNANT NEOPLASM OF CENTRAL PORTION OF LEFT BREAST IN FEMALE, ESTROGEN RECEPTOR POSITIVE: ICD-10-CM

## 2024-03-07 DIAGNOSIS — M85.80 OSTEOPENIA, UNSPECIFIED LOCATION: ICD-10-CM

## 2024-03-07 DIAGNOSIS — C50.112 MALIGNANT NEOPLASM OF CENTRAL PORTION OF LEFT BREAST IN FEMALE, ESTROGEN RECEPTOR POSITIVE: Primary | ICD-10-CM

## 2024-03-07 DIAGNOSIS — Z17.0 MALIGNANT NEOPLASM OF CENTRAL PORTION OF LEFT BREAST IN FEMALE, ESTROGEN RECEPTOR POSITIVE: ICD-10-CM

## 2024-03-07 DIAGNOSIS — Z17.0 MALIGNANT NEOPLASM OF CENTRAL PORTION OF LEFT BREAST IN FEMALE, ESTROGEN RECEPTOR POSITIVE: Primary | ICD-10-CM

## 2024-03-07 LAB
ALBUMIN SERPL-MCNC: 4.2 G/DL (ref 3.4–4.8)
ALBUMIN/GLOB SERPL: 1.5 RATIO (ref 1.1–2)
ALP SERPL-CCNC: 75 UNIT/L (ref 40–150)
ALT SERPL-CCNC: 16 UNIT/L (ref 0–55)
AST SERPL-CCNC: 19 UNIT/L (ref 5–34)
BASOPHILS # BLD AUTO: 0.02 X10(3)/MCL
BASOPHILS NFR BLD AUTO: 0.3 %
BILIRUB SERPL-MCNC: 0.8 MG/DL
BUN SERPL-MCNC: 20.6 MG/DL (ref 9.8–20.1)
CALCIUM SERPL-MCNC: 9.4 MG/DL (ref 8.4–10.2)
CHLORIDE SERPL-SCNC: 104 MMOL/L (ref 98–107)
CO2 SERPL-SCNC: 27 MMOL/L (ref 23–31)
CREAT SERPL-MCNC: 0.87 MG/DL (ref 0.55–1.02)
EOSINOPHIL # BLD AUTO: 0.11 X10(3)/MCL (ref 0–0.9)
EOSINOPHIL NFR BLD AUTO: 1.9 %
ERYTHROCYTE [DISTWIDTH] IN BLOOD BY AUTOMATED COUNT: 13.7 % (ref 11.5–17)
GFR SERPLBLD CREATININE-BSD FMLA CKD-EPI: >60 MLS/MIN/1.73/M2
GLOBULIN SER-MCNC: 2.8 GM/DL (ref 2.4–3.5)
GLUCOSE SERPL-MCNC: 102 MG/DL (ref 82–115)
HCT VFR BLD AUTO: 43.6 % (ref 37–47)
HGB BLD-MCNC: 14.1 G/DL (ref 12–16)
IMM GRANULOCYTES # BLD AUTO: 0.01 X10(3)/MCL (ref 0–0.04)
IMM GRANULOCYTES NFR BLD AUTO: 0.2 %
LYMPHOCYTES # BLD AUTO: 1.74 X10(3)/MCL (ref 0.6–4.6)
LYMPHOCYTES NFR BLD AUTO: 29.6 %
MCH RBC QN AUTO: 28 PG (ref 27–31)
MCHC RBC AUTO-ENTMCNC: 32.3 G/DL (ref 33–36)
MCV RBC AUTO: 86.7 FL (ref 80–94)
MONOCYTES # BLD AUTO: 0.4 X10(3)/MCL (ref 0.1–1.3)
MONOCYTES NFR BLD AUTO: 6.8 %
NEUTROPHILS # BLD AUTO: 3.59 X10(3)/MCL (ref 2.1–9.2)
NEUTROPHILS NFR BLD AUTO: 61.2 %
PLATELET # BLD AUTO: 225 X10(3)/MCL (ref 130–400)
PMV BLD AUTO: 9.4 FL (ref 7.4–10.4)
POTASSIUM SERPL-SCNC: 4.8 MMOL/L (ref 3.5–5.1)
PROT SERPL-MCNC: 7 GM/DL (ref 5.8–7.6)
RBC # BLD AUTO: 5.03 X10(6)/MCL (ref 4.2–5.4)
SODIUM SERPL-SCNC: 141 MMOL/L (ref 136–145)
WBC # SPEC AUTO: 5.87 X10(3)/MCL (ref 4.5–11.5)

## 2024-03-07 PROCEDURE — 1160F RVW MEDS BY RX/DR IN RCRD: CPT | Mod: CPTII,S$GLB,,

## 2024-03-07 PROCEDURE — 80053 COMPREHEN METABOLIC PANEL: CPT

## 2024-03-07 PROCEDURE — 1159F MED LIST DOCD IN RCRD: CPT | Mod: CPTII,S$GLB,,

## 2024-03-07 PROCEDURE — 36415 COLL VENOUS BLD VENIPUNCTURE: CPT

## 2024-03-07 PROCEDURE — 3078F DIAST BP <80 MM HG: CPT | Mod: CPTII,S$GLB,,

## 2024-03-07 PROCEDURE — 1126F AMNT PAIN NOTED NONE PRSNT: CPT | Mod: CPTII,S$GLB,,

## 2024-03-07 PROCEDURE — 3008F BODY MASS INDEX DOCD: CPT | Mod: CPTII,S$GLB,,

## 2024-03-07 PROCEDURE — 99215 OFFICE O/P EST HI 40 MIN: CPT | Mod: S$GLB,,,

## 2024-03-07 PROCEDURE — 99999 PR PBB SHADOW E&M-EST. PATIENT-LVL IV: CPT | Mod: PBBFAC,,,

## 2024-03-07 PROCEDURE — 3077F SYST BP >= 140 MM HG: CPT | Mod: CPTII,S$GLB,,

## 2024-03-07 PROCEDURE — 85025 COMPLETE CBC W/AUTO DIFF WBC: CPT

## 2024-03-07 NOTE — PROGRESS NOTES
Subjective:       Patient ID: Yoli Burnett is a 68 y.o. female.    Surgeon: Dr. Mary Painter  PCP: Dr. Valerie Quispe    Left Breast Cancer Stage IA ([id number])--Diagnosed 21  Biopsy/pathology: Left breast 11:00 mass biopsy done 21--invasive carcinoma with mixed ductal and lobular features, Grade 1, carcinoma present on 5 cores and measures at least 7mm, ER 95%, AK 96%, Her 2 0 by IHC, negative, Ki67 low 5%.   Surgery/pathology: Left breast lumpectomy with SLN biopsy done 22--invasive ductal carcinoma 8mm, Grade 1 with organizing biopsy site changes, no LVI, no DCIS, resection margin clear, proliferative fibrocystic changes with focal atypical lobular hyperplasia and LCIS, 2 SLNs negative.   Imagin. Screening MMG bilateral 21--focal asymmetry with possible architectural distortion in the left beast UIQ middle depth needs further evaluation, BIRADS 0.   2. Left Diagnostic MMG/US done 21--irregular, spiculated mass in 11:00 left breast, posterior depth, highly suggestive of malignancy, measures 11mm, BIRADS 5.     DEXA:  20--AP spine T= -0.3, Femur neck left -2.1, right -1.8, total femur left -2.1, right -2.2 c/w osteopenia.   22--AP spine T= 0.8, Femur neck left -2.3, right -1.9, total femur left -2.3, right -1.9, total femur right -2.1 c/w osteopenia, some improved, some worse.    Treatment History:  Adjuvant XRT x 4 weeks, completed on 22.     Current treatment plan:   1. Femara started on 22, changed to Arimidex 10/19/22 for HA.  2. Fosamax weekly started 2022. Changed to Boniva on 23.     Chief Complaint: No chief complaint on file.    HPI   The patient presents today for scheduled follow-up for her breast cancer. She reports compliance with arimidex and boniva. Still has the joint pains in her knees and is planning to have double knee replacement in the future. Denies any new pain, headaches or changes in her bowels. Last mammogram 23  benign . 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 No other problems reported.     Past Medical History:   Diagnosis Date    Breast cancer     H/O partial mastectomy     Hyperlipidemia     Hypertension     Menopause     Seizures       Review of patient's allergies indicates:  No Known Allergies     Current Outpatient Medications:     anastrozole (ARIMIDEX) 1 mg Tab, TAKE 1 TABLET BY MOUTH EVERY DAY, Disp: 90 tablet, Rfl: 0    atorvastatin (LIPITOR) 20 MG tablet, Take 20 mg by mouth once daily., Disp: , Rfl:     cholecalciferol, vitamin D3, (VITAMIN D3) 50 mcg (2,000 unit) Cap capsule, Take by mouth once daily., Disp: , Rfl:     ibandronate (BONIVA) 150 mg tablet, Take 1 tablet (150 mg total) by mouth every 30 days., Disp: 1 tablet, Rfl: 11    lacosamide (VIMPAT) 100 mg Tab, Take 1 tablet (100 mg total) by mouth every 12 (twelve) hours., Disp: 60 tablet, Rfl: 5    losartan (COZAAR) 25 MG tablet, Take 25 mg by mouth once daily., Disp: , Rfl:      Review of Systems   Constitutional:  Negative for appetite change, fatigue, fever and unexpected weight change.   HENT:  Negative for mouth sores.    Eyes:  Negative for visual disturbance.   Respiratory:  Negative for cough and shortness of breath.    Cardiovascular:  Negative for chest pain.   Gastrointestinal:  Negative for abdominal pain and diarrhea.   Genitourinary:  Negative for frequency and hot flashes.   Musculoskeletal:  Positive for arthralgias and leg pain. Negative for back pain.   Integumentary:  Negative for rash, breast mass, breast discharge and breast tenderness.   Allergic/Immunologic: Negative for immunocompromised state.   Neurological:  Negative for headaches.   Hematological:  Negative for adenopathy.   Psychiatric/Behavioral:  The patient is not nervous/anxious.    Breast: Negative for mass and tenderness        Vitals:    03/07/24 1248   BP: (!) 153/79   Pulse: 63   Resp: 14   Temp: 97.9 °F (36.6 °C)       Physical Exam  Vitals reviewed.   Constitutional:        Appearance: Normal appearance. She is overweight.   HENT:      Head: Normocephalic.   Eyes:      General: Lids are normal. Vision grossly intact.      Extraocular Movements: Extraocular movements intact.      Conjunctiva/sclera: Conjunctivae normal.   Cardiovascular:      Rate and Rhythm: Normal rate and regular rhythm.      Pulses: Normal pulses.      Heart sounds: Normal heart sounds, S1 normal and S2 normal.   Pulmonary:      Effort: Pulmonary effort is normal.      Breath sounds: Normal breath sounds.   Chest:      Comments: Breat exam deferred per patient request, mammogram up to date  Abdominal:      General: Abdomen is protuberant. Bowel sounds are normal.      Palpations: Abdomen is soft.   Musculoskeletal:      Cervical back: Normal range of motion and neck supple.      Right lower leg: No edema.      Left lower leg: No edema.   Lymphadenopathy:      Cervical: No cervical adenopathy.      Upper Body:      Right upper body: No supraclavicular or axillary adenopathy.      Left upper body: No supraclavicular or axillary adenopathy.   Skin:     General: Skin is warm and moist.      Capillary Refill: Capillary refill takes less than 2 seconds.   Neurological:      General: No focal deficit present.      Mental Status: She is alert and oriented to person, place, and time.   Psychiatric:         Attention and Perception: Attention normal.         Mood and Affect: Mood normal.         Speech: Speech normal.         Behavior: Behavior normal. Behavior is cooperative.         Cognition and Memory: Cognition normal.         Judgment: Judgment normal.       ECOG SCORE           Lab Visit on 03/07/2024   Component Date Value    WBC 03/07/2024 5.87     RBC 03/07/2024 5.03     Hgb 03/07/2024 14.1     Hct 03/07/2024 43.6     MCV 03/07/2024 86.7     MCH 03/07/2024 28.0     MCHC 03/07/2024 32.3 (L)     RDW 03/07/2024 13.7     Platelet 03/07/2024 225     MPV 03/07/2024 9.4     Neut % 03/07/2024 61.2     Lymph %  03/07/2024 29.6     Mono % 03/07/2024 6.8     Eos % 03/07/2024 1.9     Basophil % 03/07/2024 0.3     Lymph # 03/07/2024 1.74     Neut # 03/07/2024 3.59     Mono # 03/07/2024 0.40     Eos # 03/07/2024 0.11     Baso # 03/07/2024 0.02     IG# 03/07/2024 0.01     IG% 03/07/2024 0.2           Assessment:       1. Malignant neoplasm of central portion of left breast in female, estrogen receptor positive    2. Osteopenia, unspecified location    3. Long term current use of aromatase inhibitor           Plan:     Patient with stage IA left breast cancer s/p lumpectomy done 1/7/22. Pathology showed 8mm IDCA, Grade 1 with clear margins, 2 negative SLNs. Tumor strongly ER and OR positive and Her2 negative.  Per NCCN guidelines, T1bN0 low grade tumors without LVI were not included in [employer] and are treated with endocrine therapy alone due to low risk.  Patient completed adjuvant radiation 4/8/22.  Recommended treatment with AI x 5-10 years.    Currently patient is doing well without any signs or symptoms to suggest disease recurrence.  Recent labs all good.   Started Femara on 4/22/22. Changed to Arimidex in 10/2022 for HA and she is tolerating better.  Repeat DEXA 12/5/22 with ongoing osteopenia. Started Fosamax in December 2022. Changed to Boniva today due to patient preference.  Plan to repeat DEXA in 12/2024.  Continue Arimidex.  Continue Calcium + Vitamin D3 twice daily.   Continue routine surveillance visits every 6 months.   Bilateral MMG 12/4/23 benign. Next MMG scheduled  12/9/24. Scheduled by her surgeon.    All questions answered at this time.      Kim Schafer, JL-C  Oncology/Hematology  [facility]

## 2024-04-18 DIAGNOSIS — Z86.69 HISTORY OF COMPLEX PARTIAL EPILEPSY: ICD-10-CM

## 2024-04-18 RX ORDER — LACOSAMIDE 100 MG/1
100 TABLET ORAL EVERY 12 HOURS
Qty: 60 TABLET | Refills: 5 | Status: SHIPPED | OUTPATIENT
Start: 2024-04-18

## 2024-06-11 DIAGNOSIS — Z17.0 MALIGNANT NEOPLASM OF CENTRAL PORTION OF RIGHT BREAST IN FEMALE, ESTROGEN RECEPTOR POSITIVE: ICD-10-CM

## 2024-06-11 DIAGNOSIS — C50.111 MALIGNANT NEOPLASM OF CENTRAL PORTION OF RIGHT BREAST IN FEMALE, ESTROGEN RECEPTOR POSITIVE: ICD-10-CM

## 2024-06-11 RX ORDER — ANASTROZOLE 1 MG/1
TABLET ORAL
Qty: 90 TABLET | Refills: 1 | Status: SHIPPED | OUTPATIENT
Start: 2024-06-11

## 2024-10-11 DIAGNOSIS — Z86.69 HISTORY OF COMPLEX PARTIAL EPILEPSY: ICD-10-CM

## 2024-10-11 RX ORDER — LACOSAMIDE 100 MG/1
100 TABLET ORAL EVERY 12 HOURS
Qty: 60 TABLET | Refills: 5 | Status: SHIPPED | OUTPATIENT
Start: 2024-10-11

## 2024-10-15 PROBLEM — E66.01 SEVERE OBESITY (BMI 35.0-39.9) WITH COMORBIDITY: Status: ACTIVE | Noted: 2022-05-23

## 2024-10-15 NOTE — PROGRESS NOTES
Subjective:       Patient ID: Yoli Burnett is a 69 y.o. female.    Surgeon: Dr. Mary Painter  PCP: Dr. Valerie Quispe    Left Breast Cancer Stage IA ([id number])--Diagnosed 21  Biopsy/pathology: Left breast 11:00 mass biopsy done 21--invasive carcinoma with mixed ductal and lobular features, Grade 1, carcinoma present on 5 cores and measures at least 7mm, ER 95%, OR 96%, Her 2 0 by IHC, negative, Ki67 low 5%.   Surgery/pathology: Left breast lumpectomy with SLN biopsy done 22--invasive ductal carcinoma 8mm, Grade 1 with organizing biopsy site changes, no LVI, no DCIS, resection margin clear, proliferative fibrocystic changes with focal atypical lobular hyperplasia and LCIS, 2 SLNs negative.   Imagin. Screening MMG bilateral 21--focal asymmetry with possible architectural distortion in the left beast UIQ middle depth needs further evaluation, BIRADS 0.   2. Left Diagnostic MMG/US done 21--irregular, spiculated mass in 11:00 left breast, posterior depth, highly suggestive of malignancy, measures 11mm, BIRADS 5.     DEXA:  20--AP spine T= -0.3, Femur neck left -2.1, right -1.8, total femur left -2.1, right -2.2 c/w osteopenia.   22--AP spine T= 0.8, Femur neck left -2.3, right -1.9, total femur left -2.3, right -1.9, total femur right -2.1 c/w osteopenia, some improved, some worse.    Treatment History:  Adjuvant XRT x 4 weeks, completed on 22.     Current treatment plan:   1. Femara started on 22, changed to Arimidex 10/19/22 for HA.  2. Fosamax weekly started 2022. Changed to Boniva on 23.     Chief Complaint: 6 Month Follow Up    HPI   The patient presents today for scheduled follow-up for her breast cancer. She reports compliance with arimidex and boniva. In the interim since she was seen last, she had both of her knees replaced. One she recovered from that, she fell and fractured her right foot. She had surgery and is now in a boot. She is  recovering and is in good spirits. Last mammogram 12/4/23 benign .     Past Medical History:   Diagnosis Date    Breast cancer     H/O partial mastectomy     Hyperlipidemia     Hypertension     Menopause     Seizures       Review of patient's allergies indicates:  No Known Allergies     Current Outpatient Medications:     anastrozole (ARIMIDEX) 1 mg Tab, TAKE 1 TABLET BY MOUTH EVERY DAY, Disp: 90 tablet, Rfl: 1    cholecalciferol, vitamin D3, (VITAMIN D3) 50 mcg (2,000 unit) Cap capsule, Take by mouth once daily., Disp: , Rfl:     CRESTOR 5 mg tablet, Take 5 mg by mouth., Disp: , Rfl:     ibandronate (BONIVA) 150 mg tablet, Take 1 tablet (150 mg total) by mouth every 30 days., Disp: 1 tablet, Rfl: 11    lacosamide (VIMPAT) 100 mg Tab, TAKE 1 TABLET BY MOUTH EVERY 12 HOURS, Disp: 60 tablet, Rfl: 5    losartan (COZAAR) 25 MG tablet, Take 25 mg by mouth once daily., Disp: , Rfl:      Review of Systems   Constitutional:  Negative for appetite change, fatigue, fever and unexpected weight change.   HENT:  Negative for mouth sores.    Eyes:  Negative for visual disturbance.   Respiratory:  Negative for cough and shortness of breath.    Cardiovascular:  Negative for chest pain.   Gastrointestinal:  Negative for abdominal pain and diarrhea.   Genitourinary:  Negative for frequency and hot flashes.   Musculoskeletal:  Positive for arthralgias and leg pain. Negative for back pain.   Integumentary:  Negative for rash, breast mass, breast discharge and breast tenderness.   Allergic/Immunologic: Negative for immunocompromised state.   Neurological:  Negative for headaches.   Hematological:  Negative for adenopathy.   Psychiatric/Behavioral:  The patient is not nervous/anxious.    Breast: Negative for mass and tenderness        Vitals:    10/16/24 0930   BP: (!) 149/75   Pulse: 65   Resp: 18   Temp: 98.5 °F (36.9 °C)         Physical Exam  Vitals reviewed.   Constitutional:       Appearance: Normal appearance. She is overweight.    HENT:      Head: Normocephalic.   Eyes:      General: Lids are normal. Vision grossly intact.      Extraocular Movements: Extraocular movements intact.      Conjunctiva/sclera: Conjunctivae normal.   Cardiovascular:      Rate and Rhythm: Normal rate and regular rhythm.      Pulses: Normal pulses.      Heart sounds: Normal heart sounds, S1 normal and S2 normal.   Pulmonary:      Effort: Pulmonary effort is normal.      Breath sounds: Normal breath sounds.   Chest:      Comments: Left breast lumpectomy.   Abdominal:      General: Abdomen is protuberant. Bowel sounds are normal.      Palpations: Abdomen is soft.   Musculoskeletal:      Cervical back: Normal range of motion and neck supple.      Right lower leg: No edema.      Left lower leg: No edema.   Lymphadenopathy:      Cervical: No cervical adenopathy.      Upper Body:      Right upper body: No supraclavicular or axillary adenopathy.      Left upper body: No supraclavicular or axillary adenopathy.   Skin:     General: Skin is warm and moist.      Capillary Refill: Capillary refill takes less than 2 seconds.   Neurological:      General: No focal deficit present.      Mental Status: She is alert and oriented to person, place, and time.   Psychiatric:         Attention and Perception: Attention normal.         Mood and Affect: Mood normal.         Speech: Speech normal.         Behavior: Behavior normal. Behavior is cooperative.         Cognition and Memory: Cognition normal.         Judgment: Judgment normal.     ECOG SCORE           Lab Visit on 10/16/2024   Component Date Value    Sodium 10/16/2024 141     Potassium 10/16/2024 3.9     Chloride 10/16/2024 104     CO2 10/16/2024 26     Glucose 10/16/2024 135 (H)     Blood Urea Nitrogen 10/16/2024 15.0     Creatinine 10/16/2024 0.85     Calcium 10/16/2024 9.7     Protein Total 10/16/2024 6.9     Albumin 10/16/2024 4.3     Globulin 10/16/2024 2.6     Albumin/Globulin Ratio 10/16/2024 1.7     Bilirubin Total  10/16/2024 0.8     ALP 10/16/2024 71     ALT 10/16/2024 13     AST 10/16/2024 19     eGFR 10/16/2024 >60     Anion Gap 10/16/2024 11.0     BUN/Creatinine Ratio 10/16/2024 18     WBC 10/16/2024 4.58     RBC 10/16/2024 5.13     Hgb 10/16/2024 14.0     Hct 10/16/2024 43.9     MCV 10/16/2024 85.6     MCH 10/16/2024 27.3     MCHC 10/16/2024 31.9 (L)     RDW 10/16/2024 13.8     Platelet 10/16/2024 206     MPV 10/16/2024 9.3     Neut % 10/16/2024 62.9     Lymph % 10/16/2024 25.5     Mono % 10/16/2024 7.6     Eos % 10/16/2024 3.1     Basophil % 10/16/2024 0.2     Lymph # 10/16/2024 1.17     Neut # 10/16/2024 2.88     Mono # 10/16/2024 0.35     Eos # 10/16/2024 0.14     Baso # 10/16/2024 0.01     IG# 10/16/2024 0.03     IG% 10/16/2024 0.7           Assessment:       1. Osteopenia, unspecified location    2. Severe obesity (BMI 35.0-39.9) with comorbidity           Plan:     Patient with stage IA left breast cancer s/p lumpectomy done 1/7/22. Pathology showed 8mm IDCA, Grade 1 with clear margins, 2 negative SLNs. Tumor strongly ER and NJ positive and Her2 negative.  Per NCCN guidelines, T1bN0 low grade tumors without LVI were not included in [employer] and are treated with endocrine therapy alone due to low risk.  Patient completed adjuvant radiation 4/8/22.  Recommended treatment with AI x 5-10 years.    Currently patient is doing well without any signs or symptoms to suggest disease recurrence.  Recent labs all good.   Started Femara on 4/22/22. Changed to Arimidex in 10/2022 for HA and she is tolerating better.  Repeat DEXA 12/5/22 with ongoing osteopenia. Started Fosamax in December 2022. Changed to Boniva due to patient preference.  Plan to repeat DEXA in 12/2024.  Continue Arimidex.  Continue Calcium + Vitamin D3 twice daily.   Continue routine surveillance visits every 6 months.   Bilateral MMG 12/4/23 benign. Next MMG scheduled  12/9/24. Scheduled by her surgeon.    All questions answered at this time.         Maria Elena  [facility]

## 2024-10-16 ENCOUNTER — LAB VISIT (OUTPATIENT)
Dept: LAB | Facility: HOSPITAL | Age: 69
End: 2024-10-16
Attending: INTERNAL MEDICINE
Payer: COMMERCIAL

## 2024-10-16 ENCOUNTER — OFFICE VISIT (OUTPATIENT)
Dept: HEMATOLOGY/ONCOLOGY | Facility: CLINIC | Age: 69
End: 2024-10-16
Payer: COMMERCIAL

## 2024-10-16 VITALS
OXYGEN SATURATION: 100 % | HEART RATE: 65 BPM | DIASTOLIC BLOOD PRESSURE: 75 MMHG | BODY MASS INDEX: 37.64 KG/M2 | TEMPERATURE: 99 F | RESPIRATION RATE: 18 BRPM | SYSTOLIC BLOOD PRESSURE: 149 MMHG | HEIGHT: 68 IN | WEIGHT: 248.38 LBS

## 2024-10-16 DIAGNOSIS — C50.112 MALIGNANT NEOPLASM OF CENTRAL PORTION OF LEFT BREAST IN FEMALE, ESTROGEN RECEPTOR POSITIVE: ICD-10-CM

## 2024-10-16 DIAGNOSIS — Z17.0 MALIGNANT NEOPLASM OF CENTRAL PORTION OF LEFT BREAST IN FEMALE, ESTROGEN RECEPTOR POSITIVE: ICD-10-CM

## 2024-10-16 DIAGNOSIS — M85.80 OSTEOPENIA, UNSPECIFIED LOCATION: Primary | ICD-10-CM

## 2024-10-16 DIAGNOSIS — Z79.811 LONG TERM CURRENT USE OF AROMATASE INHIBITOR: ICD-10-CM

## 2024-10-16 DIAGNOSIS — M85.80 OSTEOPENIA, UNSPECIFIED LOCATION: ICD-10-CM

## 2024-10-16 DIAGNOSIS — E66.01 SEVERE OBESITY (BMI 35.0-39.9) WITH COMORBIDITY: ICD-10-CM

## 2024-10-16 LAB
ALBUMIN SERPL-MCNC: 4.3 G/DL (ref 3.4–4.8)
ALBUMIN/GLOB SERPL: 1.7 RATIO (ref 1.1–2)
ALP SERPL-CCNC: 71 UNIT/L (ref 40–150)
ALT SERPL-CCNC: 13 UNIT/L (ref 0–55)
ANION GAP SERPL CALC-SCNC: 11 MEQ/L
AST SERPL-CCNC: 19 UNIT/L (ref 5–34)
BASOPHILS # BLD AUTO: 0.01 X10(3)/MCL
BASOPHILS NFR BLD AUTO: 0.2 %
BILIRUB SERPL-MCNC: 0.8 MG/DL
BUN SERPL-MCNC: 15 MG/DL (ref 9.8–20.1)
CALCIUM SERPL-MCNC: 9.7 MG/DL (ref 8.4–10.2)
CHLORIDE SERPL-SCNC: 104 MMOL/L (ref 98–107)
CO2 SERPL-SCNC: 26 MMOL/L (ref 23–31)
CREAT SERPL-MCNC: 0.85 MG/DL (ref 0.55–1.02)
CREAT/UREA NIT SERPL: 18
EOSINOPHIL # BLD AUTO: 0.14 X10(3)/MCL (ref 0–0.9)
EOSINOPHIL NFR BLD AUTO: 3.1 %
ERYTHROCYTE [DISTWIDTH] IN BLOOD BY AUTOMATED COUNT: 13.8 % (ref 11.5–17)
GFR SERPLBLD CREATININE-BSD FMLA CKD-EPI: >60 ML/MIN/1.73/M2
GLOBULIN SER-MCNC: 2.6 GM/DL (ref 2.4–3.5)
GLUCOSE SERPL-MCNC: 135 MG/DL (ref 82–115)
HCT VFR BLD AUTO: 43.9 % (ref 37–47)
HGB BLD-MCNC: 14 G/DL (ref 12–16)
IMM GRANULOCYTES # BLD AUTO: 0.03 X10(3)/MCL (ref 0–0.04)
IMM GRANULOCYTES NFR BLD AUTO: 0.7 %
LYMPHOCYTES # BLD AUTO: 1.17 X10(3)/MCL (ref 0.6–4.6)
LYMPHOCYTES NFR BLD AUTO: 25.5 %
MCH RBC QN AUTO: 27.3 PG (ref 27–31)
MCHC RBC AUTO-ENTMCNC: 31.9 G/DL (ref 33–36)
MCV RBC AUTO: 85.6 FL (ref 80–94)
MONOCYTES # BLD AUTO: 0.35 X10(3)/MCL (ref 0.1–1.3)
MONOCYTES NFR BLD AUTO: 7.6 %
NEUTROPHILS # BLD AUTO: 2.88 X10(3)/MCL (ref 2.1–9.2)
NEUTROPHILS NFR BLD AUTO: 62.9 %
PLATELET # BLD AUTO: 206 X10(3)/MCL (ref 130–400)
PMV BLD AUTO: 9.3 FL (ref 7.4–10.4)
POTASSIUM SERPL-SCNC: 3.9 MMOL/L (ref 3.5–5.1)
PROT SERPL-MCNC: 6.9 GM/DL (ref 5.8–7.6)
RBC # BLD AUTO: 5.13 X10(6)/MCL (ref 4.2–5.4)
SODIUM SERPL-SCNC: 141 MMOL/L (ref 136–145)
WBC # BLD AUTO: 4.58 X10(3)/MCL (ref 4.5–11.5)

## 2024-10-16 PROCEDURE — 85025 COMPLETE CBC W/AUTO DIFF WBC: CPT

## 2024-10-16 PROCEDURE — 4010F ACE/ARB THERAPY RXD/TAKEN: CPT | Mod: CPTII,S$GLB,, | Performed by: NURSE PRACTITIONER

## 2024-10-16 PROCEDURE — 99214 OFFICE O/P EST MOD 30 MIN: CPT | Mod: S$GLB,,, | Performed by: NURSE PRACTITIONER

## 2024-10-16 PROCEDURE — 3008F BODY MASS INDEX DOCD: CPT | Mod: CPTII,S$GLB,, | Performed by: NURSE PRACTITIONER

## 2024-10-16 PROCEDURE — 1159F MED LIST DOCD IN RCRD: CPT | Mod: CPTII,S$GLB,, | Performed by: NURSE PRACTITIONER

## 2024-10-16 PROCEDURE — 99999 PR PBB SHADOW E&M-EST. PATIENT-LVL IV: CPT | Mod: PBBFAC,,, | Performed by: NURSE PRACTITIONER

## 2024-10-16 PROCEDURE — 1160F RVW MEDS BY RX/DR IN RCRD: CPT | Mod: CPTII,S$GLB,, | Performed by: NURSE PRACTITIONER

## 2024-10-16 PROCEDURE — 80053 COMPREHEN METABOLIC PANEL: CPT

## 2024-10-16 PROCEDURE — 36415 COLL VENOUS BLD VENIPUNCTURE: CPT

## 2024-10-16 PROCEDURE — 3077F SYST BP >= 140 MM HG: CPT | Mod: CPTII,S$GLB,, | Performed by: NURSE PRACTITIONER

## 2024-10-16 PROCEDURE — 3078F DIAST BP <80 MM HG: CPT | Mod: CPTII,S$GLB,, | Performed by: NURSE PRACTITIONER

## 2024-10-16 PROCEDURE — 1126F AMNT PAIN NOTED NONE PRSNT: CPT | Mod: CPTII,S$GLB,, | Performed by: NURSE PRACTITIONER

## 2024-10-16 RX ORDER — ROSUVASTATIN CALCIUM 5 MG
5 TABLET ORAL
COMMUNITY
Start: 2024-07-01

## 2024-11-24 DIAGNOSIS — M85.80 OSTEOPENIA, UNSPECIFIED LOCATION: ICD-10-CM

## 2024-11-25 RX ORDER — IBANDRONATE SODIUM 150 MG/1
150 TABLET, FILM COATED ORAL
Qty: 3 TABLET | Refills: 1 | Status: SHIPPED | OUTPATIENT
Start: 2024-11-25

## 2024-12-09 ENCOUNTER — HOSPITAL ENCOUNTER (OUTPATIENT)
Dept: RADIOLOGY | Facility: HOSPITAL | Age: 69
Discharge: HOME | End: 2024-12-09
Attending: NURSE PRACTITIONER
Payer: COMMERCIAL

## 2024-12-09 ENCOUNTER — HOSPITAL ENCOUNTER (OUTPATIENT)
Dept: RADIOLOGY | Facility: HOSPITAL | Age: 69
Discharge: HOME | End: 2024-12-09
Attending: PHYSICIAN ASSISTANT
Payer: COMMERCIAL

## 2024-12-09 DIAGNOSIS — E66.01 SEVERE OBESITY (BMI 35.0-39.9) WITH COMORBIDITY: ICD-10-CM

## 2024-12-09 DIAGNOSIS — Z12.31 SCREENING MAMMOGRAM, ENCOUNTER FOR: ICD-10-CM

## 2024-12-09 DIAGNOSIS — M85.80 OSTEOPENIA, UNSPECIFIED LOCATION: ICD-10-CM

## 2024-12-09 PROCEDURE — 77080 DXA BONE DENSITY AXIAL: CPT | Mod: TC

## 2024-12-09 PROCEDURE — 77067 SCR MAMMO BI INCL CAD: CPT | Mod: 26,,, | Performed by: RADIOLOGY

## 2024-12-09 PROCEDURE — 77063 BREAST TOMOSYNTHESIS BI: CPT | Mod: TC

## 2024-12-09 PROCEDURE — 77063 BREAST TOMOSYNTHESIS BI: CPT | Mod: 26,,, | Performed by: RADIOLOGY

## 2024-12-11 ENCOUNTER — PATIENT MESSAGE (OUTPATIENT)
Dept: HEMATOLOGY/ONCOLOGY | Facility: CLINIC | Age: 69
End: 2024-12-11
Payer: COMMERCIAL

## 2024-12-11 NOTE — PROGRESS NOTES
Recent DEXA shows worsening osteopenia. She is currently on Boniva monthly but it looks like we need to change to Prolia injections every 6 months. Can you call and let her know and see if she is willing to start Prolia?

## 2024-12-16 ENCOUNTER — OFFICE VISIT (OUTPATIENT)
Dept: NEUROLOGY | Facility: CLINIC | Age: 69
End: 2024-12-16
Payer: COMMERCIAL

## 2024-12-16 VITALS
DIASTOLIC BLOOD PRESSURE: 85 MMHG | BODY MASS INDEX: 38.8 KG/M2 | HEIGHT: 68 IN | SYSTOLIC BLOOD PRESSURE: 145 MMHG | HEART RATE: 68 BPM | WEIGHT: 256 LBS

## 2024-12-16 DIAGNOSIS — G40.209 COMPLEX PARTIAL EPILEPSY: Primary | ICD-10-CM

## 2024-12-16 DIAGNOSIS — Z86.011 HISTORY OF CEREBRAL MENINGIOMA: ICD-10-CM

## 2024-12-16 PROCEDURE — 3079F DIAST BP 80-89 MM HG: CPT | Mod: CPTII,S$GLB,, | Performed by: PSYCHIATRY & NEUROLOGY

## 2024-12-16 PROCEDURE — 4010F ACE/ARB THERAPY RXD/TAKEN: CPT | Mod: CPTII,S$GLB,, | Performed by: PSYCHIATRY & NEUROLOGY

## 2024-12-16 PROCEDURE — 99213 OFFICE O/P EST LOW 20 MIN: CPT | Mod: S$GLB,,, | Performed by: PSYCHIATRY & NEUROLOGY

## 2024-12-16 PROCEDURE — 1101F PT FALLS ASSESS-DOCD LE1/YR: CPT | Mod: CPTII,S$GLB,, | Performed by: PSYCHIATRY & NEUROLOGY

## 2024-12-16 PROCEDURE — 1159F MED LIST DOCD IN RCRD: CPT | Mod: CPTII,S$GLB,, | Performed by: PSYCHIATRY & NEUROLOGY

## 2024-12-16 PROCEDURE — 3077F SYST BP >= 140 MM HG: CPT | Mod: CPTII,S$GLB,, | Performed by: PSYCHIATRY & NEUROLOGY

## 2024-12-16 PROCEDURE — 3288F FALL RISK ASSESSMENT DOCD: CPT | Mod: CPTII,S$GLB,, | Performed by: PSYCHIATRY & NEUROLOGY

## 2024-12-16 PROCEDURE — 99999 PR PBB SHADOW E&M-EST. PATIENT-LVL III: CPT | Mod: PBBFAC,,, | Performed by: PSYCHIATRY & NEUROLOGY

## 2024-12-16 PROCEDURE — 3008F BODY MASS INDEX DOCD: CPT | Mod: CPTII,S$GLB,, | Performed by: PSYCHIATRY & NEUROLOGY

## 2024-12-16 NOTE — PROGRESS NOTES
Chief Complaint   Patient presents with    Seizures     Pt denies seizures since last OV         This is a 69 y.o. female here for follow up for seizures.  Patient denies any seizures in the last year.  She is doing well.  Call she is status post partial mastectomy.  She is in remission.  She is on Vimpat 100 b.i.d. with no side effects.  Recall she did not tolerate Keppra or Dilantin in the past.  Keppra caused depression.  She does mentioned that she had weaned once in the past and had a recurrent seizure.  She has thought about weaning after she retires from work which may be next year but is conflicted about it due to driving restriction.    MRI brain 12/23/2022-encephalomalacia in the left frontal lobe inferior frontal gyrus and left temporal lobe anterior horn, left parietal bone craniotomy changes, moderate chronic microvascular ischemia, no MRI evidence of recurrent meningioma.    Medication List with Changes/Refills   Current Medications    ANASTROZOLE (ARIMIDEX) 1 MG TAB    TAKE 1 TABLET BY MOUTH EVERY DAY    CHOLECALCIFEROL, VITAMIN D3, (VITAMIN D3) 50 MCG (2,000 UNIT) CAP CAPSULE    Take by mouth once daily.    CRESTOR 5 MG TABLET    Take 5 mg by mouth once daily.    IBANDRONATE (BONIVA) 150 MG TABLET    TAKE 1 TABLET BY MOUTH EVERY 30 DAYS.    LACOSAMIDE (VIMPAT) 100 MG TAB    TAKE 1 TABLET BY MOUTH EVERY 12 HOURS    LOSARTAN (COZAAR) 25 MG TABLET    Take 25 mg by mouth once daily.        Vitals:    12/16/24 0838   BP: (!) 145/85   Pulse: 68        NAD  Alert and oriented  Cognition and perception intact  No aphasia  EOMI  No facial asymmetry  No dysarthria  Moves all extremities symmetrically  No gross coordination abnormalities  Gait normal     1. Complex partial epilepsy    2. History of cerebral meningioma  Overview:  repaired/embolization years ago.  repaired/embolization years ago.       Continue Vimpat 100 twice daily

## 2025-01-06 ENCOUNTER — INFUSION (OUTPATIENT)
Dept: INFUSION THERAPY | Facility: HOSPITAL | Age: 70
End: 2025-01-06
Attending: INTERNAL MEDICINE
Payer: COMMERCIAL

## 2025-01-06 VITALS
TEMPERATURE: 96 F | HEART RATE: 71 BPM | SYSTOLIC BLOOD PRESSURE: 165 MMHG | OXYGEN SATURATION: 96 % | DIASTOLIC BLOOD PRESSURE: 77 MMHG

## 2025-01-06 DIAGNOSIS — C50.112 MALIGNANT NEOPLASM OF CENTRAL PORTION OF LEFT BREAST IN FEMALE, ESTROGEN RECEPTOR POSITIVE: Primary | ICD-10-CM

## 2025-01-06 DIAGNOSIS — Z17.0 MALIGNANT NEOPLASM OF CENTRAL PORTION OF LEFT BREAST IN FEMALE, ESTROGEN RECEPTOR POSITIVE: Primary | ICD-10-CM

## 2025-01-06 DIAGNOSIS — M85.80 OSTEOPENIA, UNSPECIFIED LOCATION: ICD-10-CM

## 2025-01-06 PROCEDURE — 63600175 PHARM REV CODE 636 W HCPCS: Mod: JZ,TB | Performed by: INTERNAL MEDICINE

## 2025-01-06 PROCEDURE — 96372 THER/PROPH/DIAG INJ SC/IM: CPT

## 2025-01-06 RX ADMIN — DENOSUMAB 60 MG: 60 INJECTION SUBCUTANEOUS at 08:01

## 2025-01-07 DIAGNOSIS — C50.111 MALIGNANT NEOPLASM OF CENTRAL PORTION OF RIGHT BREAST IN FEMALE, ESTROGEN RECEPTOR POSITIVE: ICD-10-CM

## 2025-01-07 DIAGNOSIS — Z17.0 MALIGNANT NEOPLASM OF CENTRAL PORTION OF RIGHT BREAST IN FEMALE, ESTROGEN RECEPTOR POSITIVE: ICD-10-CM

## 2025-01-07 RX ORDER — ANASTROZOLE 1 MG/1
TABLET ORAL
Qty: 90 TABLET | Refills: 1 | Status: SHIPPED | OUTPATIENT
Start: 2025-01-07

## 2025-01-07 NOTE — PROGRESS NOTES
[facility] - [facility] [place] [facility]  Breast Surgical Oncology  Follow-Up Patient Office Visit       Referring Provider: No ref. provider found   PCP: Leslie Quispe MD   Care Team:     Chief Complaint:   Chief Complaint   Patient presents with    Follow-up     Patient reports no breast related concerns      Subjective:      Cancer Staging   Malignant neoplasm of central portion of left breast in female, estrogen receptor positive  Staging form: Breast, AJCC 8th Edition  - Pathologic stage from 2021: Stage IA (pT1b, pN0(sn), cM0, G1, ER+, OR+, HER2-) - Signed by Terry Howell FNP on 2022    Treatment History:  1. S/P left breast partial mastectomy and SLNB 2022  2. Adjuvant XRT x 4 weeks, completed on 22.   3. AI expected X 5-10 years. Femara started on 22. Changed to Arimidex 10/19/22 for HA    Interval History:  2025 - Yoli Burnett is here for follow up for breast cancer. She is doing well and has no breast concerns. She is here today for clinical breast exam. SCR MG was done last month and is benign.    HPI:  Yoli Burnett initially presents on 21 at age 66 Years with AJCC 8th ed clinical anatomic stage IA / prognostic stage IA (cT1c cN0 M0) Left breast 11 o'clock posterior depth infiltrating mammary carcinoma with mixed ductal and lobular features grade 1 ER 95%, OR 96%, HER2 0 - negative on IHC and Ki 67 - 5%.  She is s/p left breast partial mastectomy and SLNB on 2021. Surgical pathology revealed grade 1 invasive ductal carcinoma measuring 8 mm. No DCIS identified. Resection margin clear. Other findings include atypical lobular hyperplasia and lobular carcinoma in situ. Two sentinel lymph nodes negative for metastases.    A detailed patient history was obtained and reviewed.    Imagin. 2021 BL SC MG at [facility] - which revealed in the left breast a focal asymmetry in the left upper inner quadrant middle depth with possible  associated architectural distortion and no other significant finding in the right breast. BI-RADS 0: additional imaging needed  2. 12/7/2021 Left DG MG / US at [facility] - which revealed on L MG a 1.1 cm irregular mass with spiculated margins in the 11 o'clock posterior depth. On L US at 11 o'clock 5 cm FN a subtle 1.1 cm x 0.8 cm x 0.9 cm irregular, hypoechoic mass with spiculated margins is noted and corresponds to the mammographic finding. BI-RADS 5: highly suspicious and biopsy was recommended.  3. 7/5/2022 L DG MG - BIRADS 2: BENIGN: There is new, expected post-therapy change of the left breast and left axilla.  There is scarring, fat necrosis, and surgical clips in the 11:00 axis posterior depth lumpectomy bed.  There are postsurgical changes in the left axilla related to prior surgical lymph node sampling.  Mild diffuse left breast skin thickening is related to prior radiation therapy.    4. 12/5/2022 BL DG MG - BIARDS 2: BENIGN: No suspicious masses, calcifications, or other signs of malignancy are identified.  Benign post lumpectomy changes are present in the left breast.  Postsurgical changes are noted in the left axilla at site of prior sentinel lymph node biopsy.  There has been no significant interval change.    5. 12/4/2023 BL DG MG and R breast US - BIRADS 2: BENIGN: Stable, benign post lumpectomy changes are present in the left breast. The suspected focal asymmetry in the upper-outer quadrant of the right breast and the asymmetry in the medial right breast do not persist with additional views and are thus considered benign. On R breast US, A 3 x 2 x 3 mm benign, simple cyst is incidentally noted in the 10:00 right breast, 3 cm from the nipple.  A band of benign, echogenic fibroglandular tissue is imaged in the 11:00 right breast, 6 cm from the nipple.   6.  12/9/2023 SCR MG at [facility] [place] - BIRADS 2: benign: 1.  There is no mammographic evidence of malignancy. 2.  Stable post lumpectomy and post radiation  changes of the left breast.    Pathology:  1. 2021 Stereotactic-guided core biopsy Left Breast 11 o'clock posterior depth - Invasive carcinoma with mixed ductal and lobular features, grade 1  ER 95%  IN 96%  HER2 Arvin 0 on IHC  Ki 67 5% (low)    2. 2022 L Partial mastectomy and SLNB - grade 1 invasive ductal carcinoma measuring 8 mm. No DCIS identified. Resection margin clear. Other findings include atypical lobular hyperplasia and lobular carcinoma in situ. Two sentinel lymph nodes negative for metastases.    OB/GYN History:  Menarche Onset: 13  Menopause: Post, at age:  Hormonal birth control (duration): no   Pregnancies: 3  Age at first pregnancy: 27  Child births: 3  Breastfeeding duration: no   Hysterectomy: no  Oophorectomy: no  HRT: no    Other:  # of breast biopsies (when and pathology results): none  MG breast density: Category B (Scattered fibroglandular)  Prior thoracic RT: none  Genetic testing: none  Ashkenazi Yazdanism descent: No    Family History:  Family History   Problem Relation Name Age of Onset    Osteoporosis Mother Nilda Stueben     Alzheimer's disease Mother Nilda Stueben     Hypertension Mother Nilda Stueben     Osteoarthritis Mother Nilda Stueben     Arthritis Mother Nilda Stueben     Heart disease Father Gio Stueben     Hypertension Father Gio Stueben     Heart attack Father Gio Stueben     Osteoporosis Sister     Denies family history of breast cancer.     Patient History:  Past Medical History:   Diagnosis Date    Breast cancer     H/O partial mastectomy     Hyperlipidemia     Hypertension     Menopause     Seizures        Past Surgical History:   Procedure Laterality Date    BRAIN SURGERY      BREAST SURGERY       SECTION      KNEE SURGERY  2024    Right 24; Left 24    ORIF FOOT FRACTURE Right 2024    R 5th Metatorsal    SEDATION, FOR RADIATION THERAPY      SINUS SURGERY         Social History     Socioeconomic History    Marital status:     Tobacco Use    Smoking status: Never    Smokeless tobacco: Never   Substance and Sexual Activity    Alcohol use: Not Currently     Comment: Rarely    Drug use: Never    Sexual activity: Yes     Partners: Male     Birth control/protection: None       Immunization History   Administered Date(s) Administered    COVID-19, MRNA, LN-S, PF (Pfizer) (Purple Cap) 01/22/2021, 02/12/2021       Medications/Allergies:  Current Outpatient Medications on File Prior to Visit   Medication Sig Dispense Refill    anastrozole (ARIMIDEX) 1 mg Tab TAKE 1 TABLET BY MOUTH EVERY DAY 90 tablet 1    cholecalciferol, vitamin D3, (VITAMIN D3) 50 mcg (2,000 unit) Cap capsule Take by mouth once daily.      CRESTOR 5 mg tablet Take 5 mg by mouth once daily.      denosumab (PROLIA) 60 mg/mL Syrg Inject 60 mg into the skin.      lacosamide (VIMPAT) 100 mg Tab TAKE 1 TABLET BY MOUTH EVERY 12 HOURS 60 tablet 5    losartan (COZAAR) 25 MG tablet Take 25 mg by mouth once daily.      ibandronate (BONIVA) 150 mg tablet TAKE 1 TABLET BY MOUTH EVERY 30 DAYS. (Patient not taking: Reported on 1/9/2025) 3 tablet 1     No current facility-administered medications on file prior to visit.       Review of patient's allergies indicates:  No Known Allergies    Review of Systems:  Pertinent items are noted in HPI.     Objective:     Vitals:  Vitals:    01/09/25 0820   BP: 138/79   Pulse:    Resp:    Temp:          Body mass index is 39.72 kg/m².     Physical Exam:  General: The patient is awake, alert and oriented times three. The patient is well nourished and in no acute distress.  Neck: There is no evidence of palpable cervical, supraclavicular or axillary adenopathy. The neck is supple. The thyroid is not enlarged.  Musculoskeletal: The patient has a normal range of motion of her bilateral upper extremities.  Chest: Examination of the chest wall fails to reveal any obvious abnormalities. Nonlabored breathing, symmetric expansion.  Breast:  Right:  Examination of right breast fails to reveal any dominant masses or areas of significant focal nodularity. The nipple is everted without evidence of discharge. There is no skin dimpling with movement of the pectoralis. There are no significant skin changes overlying the breast.   Left: Examination of the left breast fails to reveal any dominant masses or areas of significant focal nodularity. The nipple is everted without evidence of discharge. There is no skin dimpling with movement of the pectoralis. There are no significant skin changes overlying the breast.  Abdomen: The abdomen is soft, flat, nontender and nondistended.  Integumentary: no rashes or skin lesions present  Neurologic: cranial nerves intact, no signs of peripheral neurological deficit, motor/sensory function intact      Assessment and Plan:       Yoli was seen today for follow-up.    Diagnoses and all orders for this visit:    Screening mammogram, encounter for  -     Mammo Digital Screening Bilat w/ Jett; Future    Malignant neoplasm of central portion of left breast in female, estrogen receptor positive    History of partial mastectomy of left breast              Plan:       BL SCR MG due Dec 2025. RTC in 1 year for CBE.    RTC in 6 months for CBE.    Continue AI and follow up with Oncology.    Healthy lifestyle guidelines were reviewed. She was encouraged to engage in regular exercise, maintain a healthy body weight, and avoid excessive alcohol consumption. Healthy nutritional guidelines were also discussed. Self-breast examination was reviewed with the patient in detail and she was encouraged to perform this on a monthly basis.        All of her questions were answered. She was advised to call if she develops any questions or concerns.    Jelly Richardson PA-C        --------------------------------------------------------------------------------------------------------------  Total time on the date of the visit ranged from 20-29 mins  (52046). Total time includes both face-to-face and non-face-to-face time personally spent by myself on the day of the visit.    Non-face-to-face time included:  _X_ preparing to see the patient such as reviewing the patient record  __ obtaining and reviewing separately obtained history  _X_ independently interpreting results  _X_ documenting clinical information in electronic health record.    Face-to-face time included:  _X_ performing an appropriate history and examination  _X_ communicating results to the patient  _X_ counseling and educating the patient  __ ordering appropriate medications  _x_ ordering appropriate tests  _X_ ordering appropriate procedures (including follow-up)  _X_ answering any questions the patient had    Total Time spent on date of visit: 25 minutes

## 2025-01-09 ENCOUNTER — OFFICE VISIT (OUTPATIENT)
Dept: SURGERY | Facility: CLINIC | Age: 70
End: 2025-01-09
Attending: PHYSICIAN ASSISTANT
Payer: COMMERCIAL

## 2025-01-09 VITALS
BODY MASS INDEX: 39.58 KG/M2 | WEIGHT: 261.19 LBS | HEIGHT: 68 IN | TEMPERATURE: 98 F | SYSTOLIC BLOOD PRESSURE: 138 MMHG | OXYGEN SATURATION: 98 % | RESPIRATION RATE: 18 BRPM | DIASTOLIC BLOOD PRESSURE: 79 MMHG | HEART RATE: 61 BPM

## 2025-01-09 DIAGNOSIS — C50.112 MALIGNANT NEOPLASM OF CENTRAL PORTION OF LEFT BREAST IN FEMALE, ESTROGEN RECEPTOR POSITIVE: ICD-10-CM

## 2025-01-09 DIAGNOSIS — Z17.0 MALIGNANT NEOPLASM OF CENTRAL PORTION OF LEFT BREAST IN FEMALE, ESTROGEN RECEPTOR POSITIVE: ICD-10-CM

## 2025-01-09 DIAGNOSIS — Z90.12 HISTORY OF PARTIAL MASTECTOMY OF LEFT BREAST: ICD-10-CM

## 2025-01-09 DIAGNOSIS — Z12.31 SCREENING MAMMOGRAM, ENCOUNTER FOR: Primary | ICD-10-CM

## 2025-01-09 PROCEDURE — 1126F AMNT PAIN NOTED NONE PRSNT: CPT | Mod: CPTII,S$GLB,, | Performed by: PHYSICIAN ASSISTANT

## 2025-01-09 PROCEDURE — 99213 OFFICE O/P EST LOW 20 MIN: CPT | Mod: S$GLB,,, | Performed by: PHYSICIAN ASSISTANT

## 2025-01-09 PROCEDURE — 3288F FALL RISK ASSESSMENT DOCD: CPT | Mod: CPTII,S$GLB,, | Performed by: PHYSICIAN ASSISTANT

## 2025-01-09 PROCEDURE — 1159F MED LIST DOCD IN RCRD: CPT | Mod: CPTII,S$GLB,, | Performed by: PHYSICIAN ASSISTANT

## 2025-01-09 PROCEDURE — 1160F RVW MEDS BY RX/DR IN RCRD: CPT | Mod: CPTII,S$GLB,, | Performed by: PHYSICIAN ASSISTANT

## 2025-01-09 PROCEDURE — 3075F SYST BP GE 130 - 139MM HG: CPT | Mod: CPTII,S$GLB,, | Performed by: PHYSICIAN ASSISTANT

## 2025-01-09 PROCEDURE — 3078F DIAST BP <80 MM HG: CPT | Mod: CPTII,S$GLB,, | Performed by: PHYSICIAN ASSISTANT

## 2025-01-09 PROCEDURE — 1100F PTFALLS ASSESS-DOCD GE2>/YR: CPT | Mod: CPTII,S$GLB,, | Performed by: PHYSICIAN ASSISTANT

## 2025-01-09 PROCEDURE — 3008F BODY MASS INDEX DOCD: CPT | Mod: CPTII,S$GLB,, | Performed by: PHYSICIAN ASSISTANT

## 2025-01-09 PROCEDURE — 99999 PR PBB SHADOW E&M-EST. PATIENT-LVL IV: CPT | Mod: PBBFAC,,, | Performed by: PHYSICIAN ASSISTANT

## 2025-01-09 RX ORDER — DENOSUMAB 60 MG/ML
60 INJECTION SUBCUTANEOUS
COMMUNITY

## 2025-04-10 ENCOUNTER — TELEPHONE (OUTPATIENT)
Dept: HEMATOLOGY/ONCOLOGY | Facility: CLINIC | Age: 70
End: 2025-04-10
Payer: COMMERCIAL

## 2025-04-10 DIAGNOSIS — Z86.69 HISTORY OF COMPLEX PARTIAL EPILEPSY: ICD-10-CM

## 2025-04-10 RX ORDER — LACOSAMIDE 100 MG/1
100 TABLET ORAL EVERY 12 HOURS
Qty: 60 TABLET | Refills: 5 | Status: SHIPPED | OUTPATIENT
Start: 2025-04-10

## 2025-05-03 DIAGNOSIS — Z17.0 MALIGNANT NEOPLASM OF CENTRAL PORTION OF RIGHT BREAST IN FEMALE, ESTROGEN RECEPTOR POSITIVE: ICD-10-CM

## 2025-05-03 DIAGNOSIS — C50.111 MALIGNANT NEOPLASM OF CENTRAL PORTION OF RIGHT BREAST IN FEMALE, ESTROGEN RECEPTOR POSITIVE: ICD-10-CM

## 2025-05-05 RX ORDER — ANASTROZOLE 1 MG/1
1 TABLET ORAL
Qty: 90 TABLET | Refills: 0 | Status: SHIPPED | OUTPATIENT
Start: 2025-05-05

## 2025-05-30 DIAGNOSIS — Z17.0 MALIGNANT NEOPLASM OF CENTRAL PORTION OF LEFT BREAST IN FEMALE, ESTROGEN RECEPTOR POSITIVE: Primary | ICD-10-CM

## 2025-05-30 DIAGNOSIS — C50.112 MALIGNANT NEOPLASM OF CENTRAL PORTION OF LEFT BREAST IN FEMALE, ESTROGEN RECEPTOR POSITIVE: Primary | ICD-10-CM

## 2025-06-02 ENCOUNTER — OFFICE VISIT (OUTPATIENT)
Dept: HEMATOLOGY/ONCOLOGY | Facility: CLINIC | Age: 70
End: 2025-06-02
Payer: COMMERCIAL

## 2025-06-02 ENCOUNTER — LAB VISIT (OUTPATIENT)
Dept: LAB | Facility: HOSPITAL | Age: 70
End: 2025-06-02
Attending: INTERNAL MEDICINE
Payer: COMMERCIAL

## 2025-06-02 VITALS
BODY MASS INDEX: 40.03 KG/M2 | WEIGHT: 264.13 LBS | SYSTOLIC BLOOD PRESSURE: 155 MMHG | TEMPERATURE: 98 F | HEART RATE: 68 BPM | DIASTOLIC BLOOD PRESSURE: 88 MMHG | HEIGHT: 68 IN | OXYGEN SATURATION: 99 % | RESPIRATION RATE: 14 BRPM

## 2025-06-02 DIAGNOSIS — Z17.0 MALIGNANT NEOPLASM OF CENTRAL PORTION OF LEFT BREAST IN FEMALE, ESTROGEN RECEPTOR POSITIVE: ICD-10-CM

## 2025-06-02 DIAGNOSIS — C50.112 MALIGNANT NEOPLASM OF CENTRAL PORTION OF LEFT BREAST IN FEMALE, ESTROGEN RECEPTOR POSITIVE: Primary | ICD-10-CM

## 2025-06-02 DIAGNOSIS — M85.89 OSTEOPENIA OF MULTIPLE SITES: ICD-10-CM

## 2025-06-02 DIAGNOSIS — E66.01 SEVERE OBESITY (BMI 35.0-39.9) WITH COMORBIDITY: ICD-10-CM

## 2025-06-02 DIAGNOSIS — C50.112 MALIGNANT NEOPLASM OF CENTRAL PORTION OF LEFT BREAST IN FEMALE, ESTROGEN RECEPTOR POSITIVE: ICD-10-CM

## 2025-06-02 DIAGNOSIS — Z17.0 MALIGNANT NEOPLASM OF CENTRAL PORTION OF LEFT BREAST IN FEMALE, ESTROGEN RECEPTOR POSITIVE: Primary | ICD-10-CM

## 2025-06-02 LAB
ALBUMIN SERPL-MCNC: 4.2 G/DL (ref 3.4–4.8)
ALBUMIN/GLOB SERPL: 1.3 RATIO (ref 1.1–2)
ALP SERPL-CCNC: 60 UNIT/L (ref 40–150)
ALT SERPL-CCNC: 25 UNIT/L (ref 0–55)
ANION GAP SERPL CALC-SCNC: 13 MEQ/L
AST SERPL-CCNC: 30 UNIT/L (ref 11–45)
BASOPHILS # BLD AUTO: 0.03 X10(3)/MCL
BASOPHILS NFR BLD AUTO: 0.5 %
BILIRUB SERPL-MCNC: 0.6 MG/DL
BUN SERPL-MCNC: 23 MG/DL (ref 9.8–20.1)
CALCIUM SERPL-MCNC: 9 MG/DL (ref 8.4–10.2)
CHLORIDE SERPL-SCNC: 107 MMOL/L (ref 98–107)
CO2 SERPL-SCNC: 19 MMOL/L (ref 23–31)
CREAT SERPL-MCNC: 0.82 MG/DL (ref 0.55–1.02)
CREAT/UREA NIT SERPL: 28
EOSINOPHIL # BLD AUTO: 0.24 X10(3)/MCL (ref 0–0.9)
EOSINOPHIL NFR BLD AUTO: 4.1 %
ERYTHROCYTE [DISTWIDTH] IN BLOOD BY AUTOMATED COUNT: 14.7 % (ref 11.5–17)
GFR SERPLBLD CREATININE-BSD FMLA CKD-EPI: >60 ML/MIN/1.73/M2
GLOBULIN SER-MCNC: 3.2 GM/DL (ref 2.4–3.5)
GLUCOSE SERPL-MCNC: 137 MG/DL (ref 82–115)
HCT VFR BLD AUTO: 43.3 % (ref 37–47)
HGB BLD-MCNC: 13.7 G/DL (ref 12–16)
IMM GRANULOCYTES # BLD AUTO: 0.02 X10(3)/MCL (ref 0–0.04)
IMM GRANULOCYTES NFR BLD AUTO: 0.3 %
LYMPHOCYTES # BLD AUTO: 1.38 X10(3)/MCL (ref 0.6–4.6)
LYMPHOCYTES NFR BLD AUTO: 23.5 %
MCH RBC QN AUTO: 27.4 PG (ref 27–31)
MCHC RBC AUTO-ENTMCNC: 31.6 G/DL (ref 33–36)
MCV RBC AUTO: 86.6 FL (ref 80–94)
MONOCYTES # BLD AUTO: 0.59 X10(3)/MCL (ref 0.1–1.3)
MONOCYTES NFR BLD AUTO: 10 %
NEUTROPHILS # BLD AUTO: 3.62 X10(3)/MCL (ref 2.1–9.2)
NEUTROPHILS NFR BLD AUTO: 61.6 %
PLATELET # BLD AUTO: 211 X10(3)/MCL (ref 130–400)
PMV BLD AUTO: 9.7 FL (ref 7.4–10.4)
POTASSIUM SERPL-SCNC: 4.3 MMOL/L (ref 3.5–5.1)
PROT SERPL-MCNC: 7.4 GM/DL (ref 5.8–7.6)
RBC # BLD AUTO: 5 X10(6)/MCL (ref 4.2–5.4)
SODIUM SERPL-SCNC: 139 MMOL/L (ref 136–145)
WBC # BLD AUTO: 5.88 X10(3)/MCL (ref 4.5–11.5)

## 2025-06-02 PROCEDURE — 99999 PR PBB SHADOW E&M-EST. PATIENT-LVL IV: CPT | Mod: PBBFAC,,, | Performed by: NURSE PRACTITIONER

## 2025-06-02 PROCEDURE — 3077F SYST BP >= 140 MM HG: CPT | Mod: CPTII,S$GLB,, | Performed by: NURSE PRACTITIONER

## 2025-06-02 PROCEDURE — 1126F AMNT PAIN NOTED NONE PRSNT: CPT | Mod: CPTII,S$GLB,, | Performed by: NURSE PRACTITIONER

## 2025-06-02 PROCEDURE — 99213 OFFICE O/P EST LOW 20 MIN: CPT | Mod: S$GLB,,, | Performed by: NURSE PRACTITIONER

## 2025-06-02 PROCEDURE — 3288F FALL RISK ASSESSMENT DOCD: CPT | Mod: CPTII,S$GLB,, | Performed by: NURSE PRACTITIONER

## 2025-06-02 PROCEDURE — 36415 COLL VENOUS BLD VENIPUNCTURE: CPT

## 2025-06-02 PROCEDURE — 85025 COMPLETE CBC W/AUTO DIFF WBC: CPT

## 2025-06-02 PROCEDURE — 4010F ACE/ARB THERAPY RXD/TAKEN: CPT | Mod: CPTII,S$GLB,, | Performed by: NURSE PRACTITIONER

## 2025-06-02 PROCEDURE — 1101F PT FALLS ASSESS-DOCD LE1/YR: CPT | Mod: CPTII,S$GLB,, | Performed by: NURSE PRACTITIONER

## 2025-06-02 PROCEDURE — G2211 COMPLEX E/M VISIT ADD ON: HCPCS | Mod: S$GLB,,, | Performed by: NURSE PRACTITIONER

## 2025-06-02 PROCEDURE — 3008F BODY MASS INDEX DOCD: CPT | Mod: CPTII,S$GLB,, | Performed by: NURSE PRACTITIONER

## 2025-06-02 PROCEDURE — 1159F MED LIST DOCD IN RCRD: CPT | Mod: CPTII,S$GLB,, | Performed by: NURSE PRACTITIONER

## 2025-06-02 PROCEDURE — 1160F RVW MEDS BY RX/DR IN RCRD: CPT | Mod: CPTII,S$GLB,, | Performed by: NURSE PRACTITIONER

## 2025-06-02 PROCEDURE — 80053 COMPREHEN METABOLIC PANEL: CPT

## 2025-06-02 PROCEDURE — 3079F DIAST BP 80-89 MM HG: CPT | Mod: CPTII,S$GLB,, | Performed by: NURSE PRACTITIONER

## 2025-07-07 ENCOUNTER — INFUSION (OUTPATIENT)
Dept: INFUSION THERAPY | Facility: HOSPITAL | Age: 70
End: 2025-07-07
Attending: INTERNAL MEDICINE
Payer: COMMERCIAL

## 2025-07-07 VITALS
SYSTOLIC BLOOD PRESSURE: 139 MMHG | OXYGEN SATURATION: 98 % | RESPIRATION RATE: 16 BRPM | TEMPERATURE: 98 F | HEART RATE: 61 BPM | DIASTOLIC BLOOD PRESSURE: 75 MMHG

## 2025-07-07 DIAGNOSIS — C50.112 MALIGNANT NEOPLASM OF CENTRAL PORTION OF LEFT BREAST IN FEMALE, ESTROGEN RECEPTOR POSITIVE: Primary | ICD-10-CM

## 2025-07-07 DIAGNOSIS — M85.80 OSTEOPENIA, UNSPECIFIED LOCATION: ICD-10-CM

## 2025-07-07 DIAGNOSIS — Z17.0 MALIGNANT NEOPLASM OF CENTRAL PORTION OF LEFT BREAST IN FEMALE, ESTROGEN RECEPTOR POSITIVE: Primary | ICD-10-CM

## 2025-07-07 PROCEDURE — 96372 THER/PROPH/DIAG INJ SC/IM: CPT

## 2025-07-07 PROCEDURE — 63600175 PHARM REV CODE 636 W HCPCS: Mod: JZ,TB | Performed by: INTERNAL MEDICINE

## 2025-07-07 RX ADMIN — DENOSUMAB 60 MG: 60 INJECTION SUBCUTANEOUS at 08:07

## 2025-07-07 NOTE — PLAN OF CARE
Prolia injection given (Q6M); tolerated well; next appointments discussed with patient; discharged home in stable condition.